# Patient Record
Sex: MALE | Race: WHITE | NOT HISPANIC OR LATINO | Employment: FULL TIME | URBAN - NONMETROPOLITAN AREA
[De-identification: names, ages, dates, MRNs, and addresses within clinical notes are randomized per-mention and may not be internally consistent; named-entity substitution may affect disease eponyms.]

---

## 2021-03-12 ENCOUNTER — HOSPITAL ENCOUNTER (EMERGENCY)
Facility: HOSPITAL | Age: 31
Discharge: HOME | End: 2021-03-12
Attending: EMERGENCY MEDICINE | Admitting: EMERGENCY MEDICINE

## 2021-03-12 VITALS
BODY MASS INDEX: 30.06 KG/M2 | WEIGHT: 210 LBS | HEART RATE: 86 BPM | RESPIRATION RATE: 18 BRPM | HEIGHT: 70 IN | OXYGEN SATURATION: 98 % | SYSTOLIC BLOOD PRESSURE: 125 MMHG | TEMPERATURE: 98.3 F | DIASTOLIC BLOOD PRESSURE: 77 MMHG

## 2021-03-12 DIAGNOSIS — J06.9 UPPER RESPIRATORY TRACT INFECTION, UNSPECIFIED TYPE: Primary | ICD-10-CM

## 2021-03-12 LAB
FLUAV RNA RESP QL NAA+PROBE: NOT DETECTED
FLUBV RNA RESP QL NAA+PROBE: NOT DETECTED
SARS-COV-2 RNA RESP QL NAA+PROBE: NOT DETECTED

## 2021-03-12 PROCEDURE — 87636 SARSCOV2 & INF A&B AMP PRB: CPT | Performed by: PHYSICIAN ASSISTANT

## 2021-03-12 PROCEDURE — 99283 EMERGENCY DEPT VISIT LOW MDM: CPT

## 2021-03-12 PROCEDURE — C9803 HOPD COVID-19 SPEC COLLECT: HCPCS

## 2021-03-12 RX ORDER — METHYLPREDNISOLONE 4 MG/1
TABLET ORAL
Qty: 21 TABLET | Refills: 0 | OUTPATIENT
Start: 2021-03-12 | End: 2022-10-19

## 2021-03-12 RX ORDER — AZITHROMYCIN 250 MG/1
TABLET, FILM COATED ORAL
Qty: 6 TABLET | Refills: 0 | OUTPATIENT
Start: 2021-03-12 | End: 2022-10-19

## 2021-03-13 NOTE — ED PROVIDER NOTES
Subjective     History provided by:  Patient  URI  Presenting symptoms: congestion, cough and rhinorrhea    Presenting symptoms: no fever    Severity:  Mild  Onset quality:  Sudden  Duration:  2 days  Timing:  Constant  Progression:  Worsening  Chronicity:  New  Relieved by:  Nothing      Review of Systems   Constitutional: Negative.  Negative for fever.   HENT: Positive for congestion and rhinorrhea.    Respiratory: Positive for cough.    Cardiovascular: Negative.  Negative for chest pain.   Gastrointestinal: Negative.  Negative for abdominal pain.   Endocrine: Negative.    Genitourinary: Negative.  Negative for dysuria.   Skin: Negative.    Neurological: Negative.    Psychiatric/Behavioral: Negative.    All other systems reviewed and are negative.      No past medical history on file.    No Known Allergies    No past surgical history on file.    No family history on file.    Social History     Socioeconomic History   • Marital status:      Spouse name: Not on file   • Number of children: Not on file   • Years of education: Not on file   • Highest education level: Not on file           Objective   Physical Exam  Vitals and nursing note reviewed.   Constitutional:       General: He is not in acute distress.     Appearance: He is well-developed. He is not diaphoretic.   HENT:      Head: Normocephalic and atraumatic.      Right Ear: External ear normal.      Left Ear: External ear normal.      Nose: Nose normal.   Eyes:      Conjunctiva/sclera: Conjunctivae normal.   Neck:      Vascular: No JVD.      Trachea: No tracheal deviation.   Cardiovascular:      Rate and Rhythm: Normal rate and regular rhythm.      Heart sounds: Normal heart sounds. No murmur.   Pulmonary:      Effort: Pulmonary effort is normal. No respiratory distress.      Breath sounds: Normal breath sounds. No wheezing.   Abdominal:      Palpations: Abdomen is soft.      Tenderness: There is no abdominal tenderness.   Musculoskeletal:          General: No deformity. Normal range of motion.      Cervical back: Normal range of motion and neck supple.   Skin:     General: Skin is warm and dry.      Coloration: Skin is not pale.      Findings: No erythema or rash.   Neurological:      Mental Status: He is alert and oriented to person, place, and time.      Cranial Nerves: No cranial nerve deficit.   Psychiatric:         Behavior: Behavior normal.         Thought Content: Thought content normal.         Procedures           ED Course                                           MDM  Number of Diagnoses or Management Options  Upper respiratory tract infection, unspecified type: new and requires workup     Amount and/or Complexity of Data Reviewed  Clinical lab tests: ordered and reviewed    Risk of Complications, Morbidity, and/or Mortality  Presenting problems: low  Diagnostic procedures: low  Management options: low    Patient Progress  Patient progress: stable      Final diagnoses:   Upper respiratory tract infection, unspecified type            Sushant Fontenot II, PA  03/12/21 5932

## 2022-10-19 ENCOUNTER — HOSPITAL ENCOUNTER (EMERGENCY)
Facility: HOSPITAL | Age: 32
Discharge: HOME | End: 2022-10-19
Attending: EMERGENCY MEDICINE | Admitting: EMERGENCY MEDICINE

## 2022-10-19 ENCOUNTER — APPOINTMENT (OUTPATIENT)
Dept: CT IMAGING | Facility: HOSPITAL | Age: 32
End: 2022-10-19

## 2022-10-19 VITALS
HEIGHT: 70 IN | WEIGHT: 180 LBS | TEMPERATURE: 97.7 F | OXYGEN SATURATION: 100 % | DIASTOLIC BLOOD PRESSURE: 72 MMHG | SYSTOLIC BLOOD PRESSURE: 124 MMHG | RESPIRATION RATE: 18 BRPM | HEART RATE: 114 BPM | BODY MASS INDEX: 25.77 KG/M2

## 2022-10-19 DIAGNOSIS — R10.9 RIGHT FLANK PAIN: Primary | ICD-10-CM

## 2022-10-19 LAB
ALBUMIN SERPL-MCNC: 4.52 G/DL (ref 3.5–5.2)
ALBUMIN/GLOB SERPL: 2.1 G/DL
ALP SERPL-CCNC: 67 U/L (ref 39–117)
ALT SERPL W P-5'-P-CCNC: 37 U/L (ref 1–41)
AMPHET+METHAMPHET UR QL: POSITIVE
AMPHETAMINES UR QL: POSITIVE
ANION GAP SERPL CALCULATED.3IONS-SCNC: 10.2 MMOL/L (ref 5–15)
AST SERPL-CCNC: 24 U/L (ref 1–40)
BARBITURATES UR QL SCN: NEGATIVE
BASOPHILS # BLD AUTO: 0.03 10*3/MM3 (ref 0–0.2)
BASOPHILS NFR BLD AUTO: 0.4 % (ref 0–1.5)
BENZODIAZ UR QL SCN: NEGATIVE
BILIRUB SERPL-MCNC: 0.5 MG/DL (ref 0–1.2)
BILIRUB UR QL STRIP: NEGATIVE
BUN SERPL-MCNC: 14 MG/DL (ref 6–20)
BUN/CREAT SERPL: 16.3 (ref 7–25)
BUPRENORPHINE SERPL-MCNC: NEGATIVE NG/ML
C TRACH RRNA CVX QL NAA+PROBE: NOT DETECTED
CALCIUM SPEC-SCNC: 9.2 MG/DL (ref 8.6–10.5)
CANNABINOIDS SERPL QL: NEGATIVE
CHLORIDE SERPL-SCNC: 104 MMOL/L (ref 98–107)
CLARITY UR: CLEAR
CO2 SERPL-SCNC: 26.8 MMOL/L (ref 22–29)
COCAINE UR QL: NEGATIVE
COLOR UR: ABNORMAL
CREAT SERPL-MCNC: 0.86 MG/DL (ref 0.76–1.27)
CRP SERPL-MCNC: <0.3 MG/DL (ref 0–0.5)
DEPRECATED RDW RBC AUTO: 45 FL (ref 37–54)
EGFRCR SERPLBLD CKD-EPI 2021: 117.3 ML/MIN/1.73
EOSINOPHIL # BLD AUTO: 0.15 10*3/MM3 (ref 0–0.4)
EOSINOPHIL NFR BLD AUTO: 2 % (ref 0.3–6.2)
ERYTHROCYTE [DISTWIDTH] IN BLOOD BY AUTOMATED COUNT: 13.4 % (ref 12.3–15.4)
GLOBULIN UR ELPH-MCNC: 2.2 GM/DL
GLUCOSE SERPL-MCNC: 127 MG/DL (ref 65–99)
GLUCOSE UR STRIP-MCNC: NEGATIVE MG/DL
HCT VFR BLD AUTO: 41.4 % (ref 37.5–51)
HGB BLD-MCNC: 14.4 G/DL (ref 13–17.7)
HGB UR QL STRIP.AUTO: NEGATIVE
IMM GRANULOCYTES # BLD AUTO: 0.02 10*3/MM3 (ref 0–0.05)
IMM GRANULOCYTES NFR BLD AUTO: 0.3 % (ref 0–0.5)
KETONES UR QL STRIP: NEGATIVE
LEUKOCYTE ESTERASE UR QL STRIP.AUTO: NEGATIVE
LYMPHOCYTES # BLD AUTO: 3.84 10*3/MM3 (ref 0.7–3.1)
LYMPHOCYTES NFR BLD AUTO: 52.4 % (ref 19.6–45.3)
MCH RBC QN AUTO: 31.6 PG (ref 26.6–33)
MCHC RBC AUTO-ENTMCNC: 34.8 G/DL (ref 31.5–35.7)
MCV RBC AUTO: 91 FL (ref 79–97)
METHADONE UR QL SCN: NEGATIVE
MONOCYTES # BLD AUTO: 0.42 10*3/MM3 (ref 0.1–0.9)
MONOCYTES NFR BLD AUTO: 5.7 % (ref 5–12)
N GONORRHOEA RRNA SPEC QL NAA+PROBE: NOT DETECTED
NEUTROPHILS NFR BLD AUTO: 2.87 10*3/MM3 (ref 1.7–7)
NEUTROPHILS NFR BLD AUTO: 39.2 % (ref 42.7–76)
NITRITE UR QL STRIP: NEGATIVE
NRBC BLD AUTO-RTO: 0 /100 WBC (ref 0–0.2)
OPIATES UR QL: NEGATIVE
OXYCODONE UR QL SCN: NEGATIVE
PCP UR QL SCN: NEGATIVE
PH UR STRIP.AUTO: 6 [PH] (ref 5–8)
PLATELET # BLD AUTO: 228 10*3/MM3 (ref 140–450)
PMV BLD AUTO: 9.1 FL (ref 6–12)
POTASSIUM SERPL-SCNC: 3.9 MMOL/L (ref 3.5–5.2)
PROPOXYPH UR QL: NEGATIVE
PROT SERPL-MCNC: 6.7 G/DL (ref 6–8.5)
PROT UR QL STRIP: NEGATIVE
RBC # BLD AUTO: 4.55 10*6/MM3 (ref 4.14–5.8)
SODIUM SERPL-SCNC: 141 MMOL/L (ref 136–145)
SP GR UR STRIP: 1.03 (ref 1–1.03)
TRICYCLICS UR QL SCN: NEGATIVE
UROBILINOGEN UR QL STRIP: ABNORMAL
WBC NRBC COR # BLD: 7.33 10*3/MM3 (ref 3.4–10.8)

## 2022-10-19 PROCEDURE — 74176 CT ABD & PELVIS W/O CONTRAST: CPT

## 2022-10-19 PROCEDURE — 80053 COMPREHEN METABOLIC PANEL: CPT | Performed by: PHYSICIAN ASSISTANT

## 2022-10-19 PROCEDURE — 85025 COMPLETE CBC W/AUTO DIFF WBC: CPT | Performed by: PHYSICIAN ASSISTANT

## 2022-10-19 PROCEDURE — 87491 CHLMYD TRACH DNA AMP PROBE: CPT | Performed by: PHYSICIAN ASSISTANT

## 2022-10-19 PROCEDURE — 99283 EMERGENCY DEPT VISIT LOW MDM: CPT

## 2022-10-19 PROCEDURE — 96375 TX/PRO/DX INJ NEW DRUG ADDON: CPT

## 2022-10-19 PROCEDURE — 80306 DRUG TEST PRSMV INSTRMNT: CPT | Performed by: PHYSICIAN ASSISTANT

## 2022-10-19 PROCEDURE — 87591 N.GONORRHOEAE DNA AMP PROB: CPT | Performed by: PHYSICIAN ASSISTANT

## 2022-10-19 PROCEDURE — 25010000002 KETOROLAC TROMETHAMINE PER 15 MG: Performed by: PHYSICIAN ASSISTANT

## 2022-10-19 PROCEDURE — 86140 C-REACTIVE PROTEIN: CPT | Performed by: PHYSICIAN ASSISTANT

## 2022-10-19 PROCEDURE — 81003 URINALYSIS AUTO W/O SCOPE: CPT | Performed by: PHYSICIAN ASSISTANT

## 2022-10-19 PROCEDURE — 96374 THER/PROPH/DIAG INJ IV PUSH: CPT

## 2022-10-19 PROCEDURE — 25010000002 ONDANSETRON PER 1 MG: Performed by: PHYSICIAN ASSISTANT

## 2022-10-19 PROCEDURE — 74176 CT ABD & PELVIS W/O CONTRAST: CPT | Performed by: RADIOLOGY

## 2022-10-19 RX ORDER — KETOROLAC TROMETHAMINE 30 MG/ML
30 INJECTION, SOLUTION INTRAMUSCULAR; INTRAVENOUS ONCE
Status: COMPLETED | OUTPATIENT
Start: 2022-10-19 | End: 2022-10-19

## 2022-10-19 RX ORDER — SODIUM CHLORIDE 0.9 % (FLUSH) 0.9 %
10 SYRINGE (ML) INJECTION AS NEEDED
Status: DISCONTINUED | OUTPATIENT
Start: 2022-10-19 | End: 2022-10-19 | Stop reason: HOSPADM

## 2022-10-19 RX ORDER — ONDANSETRON 2 MG/ML
4 INJECTION INTRAMUSCULAR; INTRAVENOUS ONCE
Status: COMPLETED | OUTPATIENT
Start: 2022-10-19 | End: 2022-10-19

## 2022-10-19 RX ADMIN — ONDANSETRON 4 MG: 2 INJECTION INTRAMUSCULAR; INTRAVENOUS at 12:55

## 2022-10-19 RX ADMIN — KETOROLAC TROMETHAMINE 30 MG: 30 INJECTION, SOLUTION INTRAMUSCULAR at 12:55

## 2022-10-19 RX ADMIN — SODIUM CHLORIDE 1000 ML: 9 INJECTION, SOLUTION INTRAVENOUS at 12:55

## 2022-10-19 NOTE — ED PROVIDER NOTES
Subjective   History of Present Illness  This is a 32 year old male patient who presents to the ER with chief complaint of low back pain. No PMH. Intermittently for 2 months, the patient has had right sided low back radiating into the right flank, dysuria, hematuria, urinary pressure/frequency and urgency. He was seen by urgent care 2 weeks ago and given flomax which helped but he says when he isn't taking the flomax, symptoms are worse. Denies nausea, vomiting, fever, diarrhea, constipation, penile discharge.         Review of Systems   Constitutional: Negative.  Negative for fever.   HENT: Negative.    Respiratory: Negative.    Cardiovascular: Negative.  Negative for chest pain.   Gastrointestinal: Positive for abdominal pain. Negative for abdominal distention, anal bleeding, blood in stool, constipation, diarrhea, nausea, rectal pain and vomiting.   Endocrine: Negative.    Genitourinary: Positive for difficulty urinating, flank pain, frequency, hematuria and urgency. Negative for decreased urine volume, dysuria, enuresis, genital sores, penile discharge, penile pain, penile swelling, scrotal swelling and testicular pain.   Musculoskeletal: Positive for back pain. Negative for arthralgias, gait problem, joint swelling, myalgias, neck pain and neck stiffness.   Skin: Negative.    Neurological: Negative.    Psychiatric/Behavioral: Negative.    All other systems reviewed and are negative.      History reviewed. No pertinent past medical history.    No Known Allergies    History reviewed. No pertinent surgical history.    History reviewed. No pertinent family history.    Social History     Socioeconomic History   • Marital status:    Tobacco Use   • Smoking status: Never   • Smokeless tobacco: Former   Substance and Sexual Activity   • Alcohol use: Not Currently   • Drug use: Not Currently   • Sexual activity: Yes           Objective   Physical Exam  Vitals and nursing note reviewed.   Constitutional:        General: He is not in acute distress.     Appearance: He is well-developed. He is not diaphoretic.   HENT:      Head: Normocephalic and atraumatic.      Right Ear: External ear normal.      Left Ear: External ear normal.      Nose: Nose normal.   Eyes:      Conjunctiva/sclera: Conjunctivae normal.      Pupils: Pupils are equal, round, and reactive to light.   Neck:      Vascular: No JVD.      Trachea: No tracheal deviation.   Cardiovascular:      Rate and Rhythm: Normal rate and regular rhythm.      Heart sounds: Normal heart sounds. No murmur heard.  Pulmonary:      Effort: Pulmonary effort is normal. No respiratory distress.      Breath sounds: Normal breath sounds. No wheezing.   Abdominal:      General: Bowel sounds are normal.      Palpations: Abdomen is soft.      Tenderness: There is no abdominal tenderness. There is no right CVA tenderness, left CVA tenderness, guarding or rebound.   Musculoskeletal:         General: No deformity. Normal range of motion.      Cervical back: Normal range of motion and neck supple.   Skin:     General: Skin is warm and dry.      Coloration: Skin is not pale.      Findings: No erythema or rash.   Neurological:      Mental Status: He is alert and oriented to person, place, and time.      Cranial Nerves: No cranial nerve deficit.   Psychiatric:         Behavior: Behavior normal.         Thought Content: Thought content normal.         Procedures           ED Course  ED Course as of 10/19/22 1426   Wed Oct 19, 2022   1325 CT Abdomen Pelvis Without Contrast  IMPRESSION:  1.  No acute findings within abdomen or pelvis.  2.  No renal or ureteral stones. No obstructive uropathy.  3.  Moderate constipation. No bowel obstruction.     This report was finalized on 10/19/2022 1:22 PM by Dr. Bo Villa MD [MM]   1423 Patient has a normal work up. Possibly already passed a kidney stone at this point. Will f/u with PCP in 2 days or return to ER if symptoms worsen.  [MM]      ED Course  User Index  [MM] Neeta Gilliland PA                                           MDM  Number of Diagnoses or Management Options     Amount and/or Complexity of Data Reviewed  Clinical lab tests: ordered and reviewed  Tests in the radiology section of CPT®: ordered and reviewed  Discuss the patient with other providers: yes        Final diagnoses:   Right flank pain       ED Disposition  ED Disposition     ED Disposition   Discharge    Condition   Stable    Comment   --             PATIENT CONNECTION - [place]  See Provider List  [place] [address]  [phone]  In 2 days           Medication List      Stop    azithromycin 250 MG tablet  Commonly known as: Zithromax     methylPREDNISolone 4 MG dose pack  Commonly known as: MEDROL             Neeta Gilliland PA  10/19/22 1425

## 2022-11-17 ENCOUNTER — HOSPITAL ENCOUNTER (EMERGENCY)
Facility: HOSPITAL | Age: 32
Discharge: HOME | End: 2022-11-17
Attending: STUDENT IN AN ORGANIZED HEALTH CARE EDUCATION/TRAINING PROGRAM | Admitting: STUDENT IN AN ORGANIZED HEALTH CARE EDUCATION/TRAINING PROGRAM

## 2022-11-17 VITALS
RESPIRATION RATE: 20 BRPM | SYSTOLIC BLOOD PRESSURE: 138 MMHG | HEIGHT: 70 IN | BODY MASS INDEX: 26.48 KG/M2 | TEMPERATURE: 100.2 F | WEIGHT: 185 LBS | HEART RATE: 92 BPM | OXYGEN SATURATION: 100 % | DIASTOLIC BLOOD PRESSURE: 81 MMHG

## 2022-11-17 DIAGNOSIS — U07.1 COVID-19: Primary | ICD-10-CM

## 2022-11-17 LAB
FLUAV SUBTYP SPEC NAA+PROBE: NOT DETECTED
FLUBV RNA ISLT QL NAA+PROBE: NOT DETECTED
SARS-COV-2 RNA PNL SPEC NAA+PROBE: DETECTED

## 2022-11-17 PROCEDURE — 87636 SARSCOV2 & INF A&B AMP PRB: CPT | Performed by: PHYSICIAN ASSISTANT

## 2022-11-17 PROCEDURE — 99283 EMERGENCY DEPT VISIT LOW MDM: CPT

## 2022-11-17 PROCEDURE — C9803 HOPD COVID-19 SPEC COLLECT: HCPCS

## 2022-11-17 RX ORDER — IBUPROFEN 400 MG/1
800 TABLET ORAL ONCE
Status: COMPLETED | OUTPATIENT
Start: 2022-11-17 | End: 2022-11-17

## 2022-11-17 RX ORDER — DEXAMETHASONE 6 MG/1
6 TABLET ORAL
Qty: 7 TABLET | Refills: 0 | Status: SHIPPED | OUTPATIENT
Start: 2022-11-17 | End: 2023-01-24

## 2022-11-17 RX ADMIN — IBUPROFEN 800 MG: 400 TABLET, FILM COATED ORAL at 07:32

## 2022-11-17 NOTE — ED PROVIDER NOTES
Subjective     History provided by:  Patient  URI  Presenting symptoms: congestion, cough, fever and rhinorrhea    Severity:  Moderate  Onset quality:  Sudden  Duration:  2 days  Timing:  Constant  Progression:  Worsening  Chronicity:  New  Relieved by:  Nothing  Risk factors: sick contacts (covid)        Review of Systems   Constitutional: Positive for fever.   HENT: Positive for congestion and rhinorrhea.    Respiratory: Positive for cough.    Cardiovascular: Negative.  Negative for chest pain.   Gastrointestinal: Negative.  Negative for abdominal pain.   Endocrine: Negative.    Genitourinary: Negative.  Negative for dysuria.   Skin: Negative.    Neurological: Negative.    Psychiatric/Behavioral: Negative.    All other systems reviewed and are negative.      No past medical history on file.    No Known Allergies    No past surgical history on file.    No family history on file.    Social History     Socioeconomic History   • Marital status:    Tobacco Use   • Smoking status: Never   • Smokeless tobacco: Former   Substance and Sexual Activity   • Alcohol use: Not Currently   • Drug use: Not Currently   • Sexual activity: Yes           Objective   Physical Exam  Vitals and nursing note reviewed.   Constitutional:       General: He is not in acute distress.     Appearance: He is well-developed. He is not diaphoretic.   HENT:      Head: Normocephalic and atraumatic.      Right Ear: External ear normal.      Left Ear: External ear normal.      Nose: Rhinorrhea present.   Eyes:      Conjunctiva/sclera: Conjunctivae normal.      Pupils: Pupils are equal, round, and reactive to light.   Neck:      Vascular: No JVD.      Trachea: No tracheal deviation.   Cardiovascular:      Rate and Rhythm: Normal rate and regular rhythm.      Heart sounds: No murmur heard.  Pulmonary:      Effort: Pulmonary effort is normal. No respiratory distress.      Breath sounds: No wheezing.   Abdominal:      Palpations: Abdomen is soft.       Tenderness: There is no abdominal tenderness.   Musculoskeletal:         General: No deformity. Normal range of motion.      Cervical back: Normal range of motion and neck supple.   Skin:     General: Skin is warm and dry.      Coloration: Skin is not pale.      Findings: No erythema or rash.   Neurological:      Mental Status: He is alert and oriented to person, place, and time.      Cranial Nerves: No cranial nerve deficit.   Psychiatric:         Behavior: Behavior normal.         Thought Content: Thought content normal.         Procedures           ED Course                                           MDM  Number of Diagnoses or Management Options  COVID-19: new and requires workup     Amount and/or Complexity of Data Reviewed  Clinical lab tests: ordered and reviewed    Risk of Complications, Morbidity, and/or Mortality  Presenting problems: low  Diagnostic procedures: low  Management options: low    Patient Progress  Patient progress: stable      Final diagnoses:   COVID-19       ED Disposition  ED Disposition     ED Disposition   Discharge    Condition   Stable    Comment   --             PATIENT CONNECTION - [facility]  See Provider List  [place] [address]  [phone]  Schedule an appointment as soon as possible for a visit            Medication List      New Prescriptions    dexamethasone 6 MG tablet  Commonly known as: DECADRON  Take 1 tablet by mouth Daily With Breakfast.           Where to Get Your Medications      These medications were sent to [facility] [id number] - [place]O MORRIS - [address] AT [facility] [address] - [phone]  - [phone] FX  [address] [place] [address]    Phone: [phone]   · dexamethasone 6 MG tablet          Ssuhant Fontenot II, PA  11/17/22 4125

## 2022-12-01 ENCOUNTER — OFFICE VISIT (OUTPATIENT)
Dept: UROLOGY | Facility: CLINIC | Age: 32
End: 2022-12-01

## 2022-12-01 VITALS — HEIGHT: 70 IN | BODY MASS INDEX: 26.48 KG/M2 | WEIGHT: 185 LBS

## 2022-12-01 DIAGNOSIS — N41.1 PROSTATITIS, CHRONIC: Primary | ICD-10-CM

## 2022-12-01 PROCEDURE — 99203 OFFICE O/P NEW LOW 30 MIN: CPT | Performed by: UROLOGY

## 2022-12-01 RX ORDER — SULFAMETHOXAZOLE AND TRIMETHOPRIM 800; 160 MG/1; MG/1
1 TABLET ORAL 2 TIMES DAILY
Qty: 84 TABLET | Refills: 2 | Status: SHIPPED | OUTPATIENT
Start: 2022-12-01 | End: 2023-01-24

## 2022-12-01 NOTE — PROGRESS NOTES
Chief Complaint:      Chief Complaint   Patient presents with   • Right flank pain     New pt       HPI:   32 y.o. male returns today has uncomfortable right-sided flank pain he does not feel like he can empty.  He has had no sex for 13 months his urine is negative.  Negligible residual.  Had a CT scan.  His CT was negative.  Start him on Bactrim for presumptive prostatitis.  He needs to increase the frequency of intercourse.    Past Medical History:     History reviewed. No pertinent past medical history.    Current Meds:     Current Outpatient Medications   Medication Sig Dispense Refill   • dexamethasone (DECADRON) 6 MG tablet Take 1 tablet by mouth Daily With Breakfast. 7 tablet 0     No current facility-administered medications for this visit.        Allergies:      No Known Allergies     Past Surgical History:     History reviewed. No pertinent surgical history.    Social History:     Social History     Socioeconomic History   • Marital status:    Tobacco Use   • Smoking status: Never   • Smokeless tobacco: Former   Substance and Sexual Activity   • Alcohol use: Not Currently   • Drug use: Not Currently   • Sexual activity: Yes       Family History:     History reviewed. No pertinent family history.    Review of Systems:     Review of Systems   Constitutional: Negative.    HENT: Negative.    Eyes: Negative.    Respiratory: Negative.    Cardiovascular: Negative.    Gastrointestinal: Negative.    Endocrine: Negative.    Musculoskeletal: Negative.    Allergic/Immunologic: Negative.    Neurological: Negative.    Hematological: Negative.    Psychiatric/Behavioral: Negative.        Physical Exam:     Physical Exam  Vitals and nursing note reviewed.   Constitutional:       Appearance: He is well-developed.   HENT:      Head: Normocephalic and atraumatic.   Eyes:      Conjunctiva/sclera: Conjunctivae normal.      Pupils: Pupils are equal, round, and reactive to light.   Cardiovascular:      Rate and Rhythm:  Normal rate and regular rhythm.      Heart sounds: Normal heart sounds.   Pulmonary:      Effort: Pulmonary effort is normal.      Breath sounds: Normal breath sounds.   Abdominal:      General: Bowel sounds are normal.      Palpations: Abdomen is soft.   Musculoskeletal:         General: Normal range of motion.      Cervical back: Normal range of motion.   Skin:     General: Skin is warm and dry.   Neurological:      Mental Status: He is alert and oriented to person, place, and time.      Deep Tendon Reflexes: Reflexes are normal and symmetric.   Psychiatric:         Behavior: Behavior normal.         Thought Content: Thought content normal.         Judgment: Judgment normal.         I have reviewed the following portions of the patient's history: Allergies, current medications, past family history, past medical history, past social history, past surgical history, problem list, and ROS and confirm it is accurate.    Recent Image (CT and/or KUB):      CT Abdomen and Pelvis: No results found for this or any previous visit.       CT Stone Protocol: No results found for this or any previous visit.       KUB: No results found for this or any previous visit.       Labs (past 3 months):      Admission on 11/17/2022, Discharged on 11/17/2022   Component Date Value Ref Range Status   • COVID19 11/17/2022 Detected (C)  Not Detected - Ref. Range Final   • Influenza A PCR 11/17/2022 Not Detected  Not Detected Final   • Influenza B PCR 11/17/2022 Not Detected  Not Detected Final   Admission on 10/19/2022, Discharged on 10/19/2022   Component Date Value Ref Range Status   • Glucose 10/19/2022 127 (H)  65 - 99 mg/dL Final   • BUN 10/19/2022 14  6 - 20 mg/dL Final   • Creatinine 10/19/2022 0.86  0.76 - 1.27 mg/dL Final   • Sodium 10/19/2022 141  136 - 145 mmol/L Final   • Potassium 10/19/2022 3.9  3.5 - 5.2 mmol/L Final   • Chloride 10/19/2022 104  98 - 107 mmol/L Final   • CO2 10/19/2022 26.8  22.0 - 29.0 mmol/L Final   • Calcium  10/19/2022 9.2  8.6 - 10.5 mg/dL Final   • Total Protein 10/19/2022 6.7  6.0 - 8.5 g/dL Final   • Albumin 10/19/2022 4.52  3.50 - 5.20 g/dL Final   • ALT (SGPT) 10/19/2022 37  1 - 41 U/L Final   • AST (SGOT) 10/19/2022 24  1 - 40 U/L Final   • Alkaline Phosphatase 10/19/2022 67  39 - 117 U/L Final   • Total Bilirubin 10/19/2022 0.5  0.0 - 1.2 mg/dL Final   • Globulin 10/19/2022 2.2  gm/dL Final   • A/G Ratio 10/19/2022 2.1  g/dL Final   • BUN/Creatinine Ratio 10/19/2022 16.3  7.0 - 25.0 Final   • Anion Gap 10/19/2022 10.2  5.0 - 15.0 mmol/L Final   • eGFR 10/19/2022 117.3  >60.0 mL/min/1.73 Final    National Kidney Foundation and American Society of Nephrology (ASN) Task Force recommended calculation based on the Chronic Kidney Disease Epidemiology Collaboration (CKD-EPI) equation refit without adjustment for race.   • C-Reactive Protein 10/19/2022 <0.30  0.00 - 0.50 mg/dL Final   • Color, UA 10/19/2022 Dark Yellow (A)  Yellow, Straw Final   • Appearance, UA 10/19/2022 Clear  Clear Final   • pH, UA 10/19/2022 6.0  5.0 - 8.0 Final   • Specific Gravity, UA 10/19/2022 1.029  1.005 - 1.030 Final   • Glucose, UA 10/19/2022 Negative  Negative Final   • Ketones, UA 10/19/2022 Negative  Negative Final   • Bilirubin, UA 10/19/2022 Negative  Negative Final   • Blood, UA 10/19/2022 Negative  Negative Final   • Protein, UA 10/19/2022 Negative  Negative Final   • Leuk Esterase, UA 10/19/2022 Negative  Negative Final   • Nitrite, UA 10/19/2022 Negative  Negative Final   • Urobilinogen, UA 10/19/2022 1.0 E.U./dL  0.2 - 1.0 E.U./dL Final   • Chlamydia DNA by PCR 10/19/2022 Not Detected  Not Detected Final   • Neisseria gonorrhoeae by PCR 10/19/2022 Not Detected  Not Detected Final   • THC, Screen, Urine 10/19/2022 Negative  Negative Final   • Phencyclidine (PCP), Urine 10/19/2022 Negative  Negative Final   • Cocaine Screen, Urine 10/19/2022 Negative  Negative Final   • Methamphetamine, Ur 10/19/2022 Positive (A)  Negative Final   •  Opiate Screen 10/19/2022 Negative  Negative Final   • Amphetamine Screen, Urine 10/19/2022 Positive (A)  Negative Final   • Benzodiazepine Screen, Urine 10/19/2022 Negative  Negative Final   • Tricyclic Antidepressants Screen 10/19/2022 Negative  Negative Final   • Methadone Screen, Urine 10/19/2022 Negative  Negative Final   • Barbiturates Screen, Urine 10/19/2022 Negative  Negative Final   • Oxycodone Screen, Urine 10/19/2022 Negative  Negative Final   • Propoxyphene Screen 10/19/2022 Negative  Negative Final   • Buprenorphine, Screen, Urine 10/19/2022 Negative  Negative Final   • WBC 10/19/2022 7.33  3.40 - 10.80 10*3/mm3 Final   • RBC 10/19/2022 4.55  4.14 - 5.80 10*6/mm3 Final   • Hemoglobin 10/19/2022 14.4  13.0 - 17.7 g/dL Final   • Hematocrit 10/19/2022 41.4  37.5 - 51.0 % Final   • MCV 10/19/2022 91.0  79.0 - 97.0 fL Final   • MCH 10/19/2022 31.6  26.6 - 33.0 pg Final   • MCHC 10/19/2022 34.8  31.5 - 35.7 g/dL Final   • RDW 10/19/2022 13.4  12.3 - 15.4 % Final   • RDW-SD 10/19/2022 45.0  37.0 - 54.0 fl Final   • MPV 10/19/2022 9.1  6.0 - 12.0 fL Final   • Platelets 10/19/2022 228  140 - 450 10*3/mm3 Final   • Neutrophil % 10/19/2022 39.2 (L)  42.7 - 76.0 % Final   • Lymphocyte % 10/19/2022 52.4 (H)  19.6 - 45.3 % Final   • Monocyte % 10/19/2022 5.7  5.0 - 12.0 % Final   • Eosinophil % 10/19/2022 2.0  0.3 - 6.2 % Final   • Basophil % 10/19/2022 0.4  0.0 - 1.5 % Final   • Immature Grans % 10/19/2022 0.3  0.0 - 0.5 % Final   • Neutrophils, Absolute 10/19/2022 2.87  1.70 - 7.00 10*3/mm3 Final   • Lymphocytes, Absolute 10/19/2022 3.84 (H)  0.70 - 3.10 10*3/mm3 Final   • Monocytes, Absolute 10/19/2022 0.42  0.10 - 0.90 10*3/mm3 Final   • Eosinophils, Absolute 10/19/2022 0.15  0.00 - 0.40 10*3/mm3 Final   • Basophils, Absolute 10/19/2022 0.03  0.00 - 0.20 10*3/mm3 Final   • Immature Grans, Absolute 10/19/2022 0.02  0.00 - 0.05 10*3/mm3 Final   • nRBC 10/19/2022 0.0  0.0 - 0.2 /100 WBC Final        Procedure:        Assessment/Plan:   Prostatitis-we discussed the differential diagnosis of prostatitis including the National Institutes of Health classification system I through IV.  I discussed that type I prostatitis is acute bacterial prostatitis and associated with high fevers, typically hematuria, and severe pain with voiding.  We discussed the fact that it necessitates 6 weeks of chronic antibiotics to be sure it doesn't turn into a chronic bacterial prostatitis that can have lifelong ramifications.  We discussed National Institutes of Health type II chronic bacterial prostatitis.  We discussed the fact that this a chronic bacterial infection of the prostate that often requires suppressive antibiotics.  We discussed type III being related more to nonspecific urethritis, as well as tight for being related to finding inflammation and a biopsy in the absence of symptomatology.  I discussed the diagnostic strategies including the VB 1 through 4 urine culture and discussed empiric therapy.  I emphasized that with the use of chronic antibiotics, I strongly recommended the concomitant use of probiotics.  Additionally, we discussed the various antibiotics used and the risks and benefits associated with each, particularly the use of long-term ciprofloxacin and the effect on joints and collagen in human beings.  Start Septra and increasing frequency of intercourse                This document has been electronically signed by HANNA SEALS MD December 1, 2022 09:14 EST    Dictated Utilizing Dragon Dictation: Part of this note may be an electronic transcription/translation of spoken language to printed text using the Dragon Dictation System.

## 2022-12-09 PROBLEM — N41.1 PROSTATITIS, CHRONIC: Status: ACTIVE | Noted: 2022-12-09

## 2022-12-16 ENCOUNTER — APPOINTMENT (OUTPATIENT)
Dept: ULTRASOUND IMAGING | Facility: HOSPITAL | Age: 32
End: 2022-12-16

## 2022-12-16 ENCOUNTER — HOSPITAL ENCOUNTER (EMERGENCY)
Facility: HOSPITAL | Age: 32
Discharge: HOME | End: 2022-12-16
Attending: EMERGENCY MEDICINE | Admitting: EMERGENCY MEDICINE

## 2022-12-16 VITALS
BODY MASS INDEX: 26.48 KG/M2 | WEIGHT: 185 LBS | HEIGHT: 70 IN | TEMPERATURE: 97.7 F | DIASTOLIC BLOOD PRESSURE: 82 MMHG | RESPIRATION RATE: 16 BRPM | HEART RATE: 94 BPM | OXYGEN SATURATION: 99 % | SYSTOLIC BLOOD PRESSURE: 122 MMHG

## 2022-12-16 DIAGNOSIS — R10.31 RIGHT INGUINAL PAIN: Primary | ICD-10-CM

## 2022-12-16 LAB
ALBUMIN SERPL-MCNC: 4.15 G/DL (ref 3.5–5.2)
ALBUMIN/GLOB SERPL: 2 G/DL
ALP SERPL-CCNC: 73 U/L (ref 39–117)
ALT SERPL W P-5'-P-CCNC: 30 U/L (ref 1–41)
ANION GAP SERPL CALCULATED.3IONS-SCNC: 8.9 MMOL/L (ref 5–15)
AST SERPL-CCNC: 21 U/L (ref 1–40)
BACTERIA UR QL AUTO: ABNORMAL /HPF
BILIRUB SERPL-MCNC: 0.2 MG/DL (ref 0–1.2)
BILIRUB UR QL STRIP: NEGATIVE
BUN SERPL-MCNC: 15 MG/DL (ref 6–20)
BUN/CREAT SERPL: 16.9 (ref 7–25)
CALCIUM SPEC-SCNC: 9.3 MG/DL (ref 8.6–10.5)
CHLORIDE SERPL-SCNC: 104 MMOL/L (ref 98–107)
CLARITY UR: ABNORMAL
CO2 SERPL-SCNC: 26.1 MMOL/L (ref 22–29)
COLOR UR: YELLOW
CREAT SERPL-MCNC: 0.89 MG/DL (ref 0.76–1.27)
CRP SERPL-MCNC: <0.3 MG/DL (ref 0–0.5)
DEPRECATED RDW RBC AUTO: 47.8 FL (ref 37–54)
EGFRCR SERPLBLD CKD-EPI 2021: 116 ML/MIN/1.73
EOSINOPHIL # BLD MANUAL: 0.42 10*3/MM3 (ref 0–0.4)
EOSINOPHIL NFR BLD MANUAL: 5 % (ref 0.3–6.2)
ERYTHROCYTE [DISTWIDTH] IN BLOOD BY AUTOMATED COUNT: 13.3 % (ref 12.3–15.4)
GLOBULIN UR ELPH-MCNC: 2.1 GM/DL
GLUCOSE SERPL-MCNC: 135 MG/DL (ref 65–99)
GLUCOSE UR STRIP-MCNC: NEGATIVE MG/DL
HCT VFR BLD AUTO: 40.8 % (ref 37.5–51)
HGB BLD-MCNC: 13.7 G/DL (ref 13–17.7)
HGB UR QL STRIP.AUTO: NEGATIVE
HYALINE CASTS UR QL AUTO: ABNORMAL /LPF
KETONES UR QL STRIP: NEGATIVE
LARGE PLATELETS: ABNORMAL
LEUKOCYTE ESTERASE UR QL STRIP.AUTO: ABNORMAL
LYMPHOCYTES # BLD MANUAL: 5.43 10*3/MM3 (ref 0.7–3.1)
LYMPHOCYTES NFR BLD MANUAL: 5 % (ref 5–12)
MACROCYTES BLD QL SMEAR: ABNORMAL
MCH RBC QN AUTO: 32.4 PG (ref 26.6–33)
MCHC RBC AUTO-ENTMCNC: 33.6 G/DL (ref 31.5–35.7)
MCV RBC AUTO: 96.5 FL (ref 79–97)
MONOCYTES # BLD: 0.42 10*3/MM3 (ref 0.1–0.9)
NEUTROPHILS # BLD AUTO: 2.21 10*3/MM3 (ref 1.7–7)
NEUTROPHILS NFR BLD MANUAL: 26 % (ref 42.7–76)
NITRITE UR QL STRIP: NEGATIVE
PH UR STRIP.AUTO: 8 [PH] (ref 5–8)
PLATELET # BLD AUTO: 233 10*3/MM3 (ref 140–450)
PMV BLD AUTO: 9 FL (ref 6–12)
POTASSIUM SERPL-SCNC: 3.7 MMOL/L (ref 3.5–5.2)
PROT SERPL-MCNC: 6.2 G/DL (ref 6–8.5)
PROT UR QL STRIP: NEGATIVE
RBC # BLD AUTO: 4.23 10*6/MM3 (ref 4.14–5.8)
RBC # UR STRIP: ABNORMAL /HPF
REF LAB TEST METHOD: ABNORMAL
SODIUM SERPL-SCNC: 139 MMOL/L (ref 136–145)
SP GR UR STRIP: 1.02 (ref 1–1.03)
SQUAMOUS #/AREA URNS HPF: ABNORMAL /HPF
UROBILINOGEN UR QL STRIP: ABNORMAL
VARIANT LYMPHS NFR BLD MANUAL: 64 % (ref 19.6–45.3)
WBC # UR STRIP: ABNORMAL /HPF
WBC NRBC COR # BLD: 8.49 10*3/MM3 (ref 3.4–10.8)

## 2022-12-16 PROCEDURE — 81001 URINALYSIS AUTO W/SCOPE: CPT | Performed by: NURSE PRACTITIONER

## 2022-12-16 PROCEDURE — 76999 ECHO EXAMINATION PROCEDURE: CPT

## 2022-12-16 PROCEDURE — 85025 COMPLETE CBC W/AUTO DIFF WBC: CPT | Performed by: NURSE PRACTITIONER

## 2022-12-16 PROCEDURE — 86140 C-REACTIVE PROTEIN: CPT | Performed by: NURSE PRACTITIONER

## 2022-12-16 PROCEDURE — 99283 EMERGENCY DEPT VISIT LOW MDM: CPT

## 2022-12-16 PROCEDURE — 36415 COLL VENOUS BLD VENIPUNCTURE: CPT

## 2022-12-16 PROCEDURE — 85007 BL SMEAR W/DIFF WBC COUNT: CPT | Performed by: NURSE PRACTITIONER

## 2022-12-16 PROCEDURE — 80053 COMPREHEN METABOLIC PANEL: CPT | Performed by: NURSE PRACTITIONER

## 2022-12-16 NOTE — ED PROVIDER NOTES
Subjective     History provided by:  Patient  Groin Pain  Location:  R groin  Severity:  Mild  Onset quality:  Sudden  Timing:  Constant  Progression:  Waxing and waning  Chronicity:  New  Context:  Pt reports recently being treated with abx for prostatitis by Dr. Haider.  Associated symptoms: no abdominal pain, no chest pain, no congestion, no cough, no diarrhea, no ear pain, no fatigue, no fever, no headaches, no loss of consciousness, no myalgias, no nausea, no rash, no rhinorrhea, no shortness of breath, no sore throat, no vomiting and no wheezing        Review of Systems   Constitutional: Negative.  Negative for fatigue and fever.   HENT: Negative.  Negative for congestion, ear pain, rhinorrhea and sore throat.    Eyes: Negative.    Respiratory: Negative.  Negative for cough, shortness of breath and wheezing.    Cardiovascular: Negative.  Negative for chest pain.   Gastrointestinal: Negative.  Negative for abdominal pain, diarrhea, nausea and vomiting.   Endocrine: Negative.    Genitourinary: Negative.  Negative for dysuria, penile discharge, penile pain and penile swelling.   Musculoskeletal: Negative for myalgias.   Skin: Negative.  Negative for rash.   Allergic/Immunologic: Negative.    Neurological: Negative.  Negative for loss of consciousness and headaches.   Hematological: Negative.    Psychiatric/Behavioral: Negative.    All other systems reviewed and are negative.      No past medical history on file.    No Known Allergies    No past surgical history on file.    No family history on file.    Social History     Socioeconomic History   • Marital status:    Tobacco Use   • Smoking status: Never   • Smokeless tobacco: Former   Substance and Sexual Activity   • Alcohol use: Not Currently   • Drug use: Not Currently   • Sexual activity: Yes           Objective   Physical Exam  Vitals and nursing note reviewed.   Constitutional:       General: He is not in acute distress.     Appearance: He is  well-developed. He is not diaphoretic.   HENT:      Head: Normocephalic and atraumatic.      Right Ear: External ear normal.      Left Ear: External ear normal.      Nose: Nose normal.      Mouth/Throat:      Mouth: Mucous membranes are moist.      Pharynx: Oropharynx is clear.   Eyes:      Conjunctiva/sclera: Conjunctivae normal.      Pupils: Pupils are equal, round, and reactive to light.   Neck:      Vascular: No JVD.      Trachea: No tracheal deviation.   Cardiovascular:      Rate and Rhythm: Normal rate and regular rhythm.      Heart sounds: Normal heart sounds. No murmur heard.  Pulmonary:      Effort: Pulmonary effort is normal. No respiratory distress.      Breath sounds: Normal breath sounds. No wheezing.   Abdominal:      General: Bowel sounds are normal.      Palpations: Abdomen is soft.      Tenderness: There is no abdominal tenderness.   Musculoskeletal:         General: No deformity. Normal range of motion.      Cervical back: Normal range of motion and neck supple.   Skin:     General: Skin is warm and dry.      Coloration: Skin is not pale.      Findings: No erythema or rash.   Neurological:      Mental Status: He is alert and oriented to person, place, and time.      Cranial Nerves: No cranial nerve deficit.   Psychiatric:         Behavior: Behavior normal.         Thought Content: Thought content normal.         Procedures       Results for orders placed or performed during the hospital encounter of 12/16/22   Comprehensive Metabolic Panel    Specimen: Arm, Left; Blood   Result Value Ref Range    Glucose 135 (H) 65 - 99 mg/dL    BUN 15 6 - 20 mg/dL    Creatinine 0.89 0.76 - 1.27 mg/dL    Sodium 139 136 - 145 mmol/L    Potassium 3.7 3.5 - 5.2 mmol/L    Chloride 104 98 - 107 mmol/L    CO2 26.1 22.0 - 29.0 mmol/L    Calcium 9.3 8.6 - 10.5 mg/dL    Total Protein 6.2 6.0 - 8.5 g/dL    Albumin 4.15 3.50 - 5.20 g/dL    ALT (SGPT) 30 1 - 41 U/L    AST (SGOT) 21 1 - 40 U/L    Alkaline Phosphatase 73 39 -  117 U/L    Total Bilirubin 0.2 0.0 - 1.2 mg/dL    Globulin 2.1 gm/dL    A/G Ratio 2.0 g/dL    BUN/Creatinine Ratio 16.9 7.0 - 25.0    Anion Gap 8.9 5.0 - 15.0 mmol/L    eGFR 116.0 >60.0 mL/min/1.73   Urinalysis With Culture If Indicated - Urine, Clean Catch    Specimen: Urine, Clean Catch   Result Value Ref Range    Color, UA Yellow Yellow, Straw    Appearance, UA Turbid (A) Clear    pH, UA 8.0 5.0 - 8.0    Specific Gravity, UA 1.019 1.005 - 1.030    Glucose, UA Negative Negative    Ketones, UA Negative Negative    Bilirubin, UA Negative Negative    Blood, UA Negative Negative    Protein, UA Negative Negative    Leuk Esterase, UA Trace (A) Negative    Nitrite, UA Negative Negative    Urobilinogen, UA 1.0 E.U./dL 0.2 - 1.0 E.U./dL   C-reactive Protein    Specimen: Arm, Left; Blood   Result Value Ref Range    C-Reactive Protein <0.30 0.00 - 0.50 mg/dL   CBC Auto Differential    Specimen: Arm, Left; Blood   Result Value Ref Range    WBC 8.49 3.40 - 10.80 10*3/mm3    RBC 4.23 4.14 - 5.80 10*6/mm3    Hemoglobin 13.7 13.0 - 17.7 g/dL    Hematocrit 40.8 37.5 - 51.0 %    MCV 96.5 79.0 - 97.0 fL    MCH 32.4 26.6 - 33.0 pg    MCHC 33.6 31.5 - 35.7 g/dL    RDW 13.3 12.3 - 15.4 %    RDW-SD 47.8 37.0 - 54.0 fl    MPV 9.0 6.0 - 12.0 fL    Platelets 233 140 - 450 10*3/mm3   Manual Differential    Specimen: Arm, Left; Blood   Result Value Ref Range    Neutrophil % 26.0 (L) 42.7 - 76.0 %    Lymphocyte % 64.0 (H) 19.6 - 45.3 %    Monocyte % 5.0 5.0 - 12.0 %    Eosinophil % 5.0 0.3 - 6.2 %    Neutrophils Absolute 2.21 1.70 - 7.00 10*3/mm3    Lymphocytes Absolute 5.43 (H) 0.70 - 3.10 10*3/mm3    Monocytes Absolute 0.42 0.10 - 0.90 10*3/mm3    Eosinophils Absolute 0.42 (H) 0.00 - 0.40 10*3/mm3    Macrocytes Slight/1+ None Seen    Large Platelets Slight/1+ None Seen   Urinalysis, Microscopic Only - Urine, Clean Catch    Specimen: Urine, Clean Catch   Result Value Ref Range    RBC, UA 3-5 (A) None Seen, 0-2 /HPF    WBC, UA 3-5 (A) None  Seen, 0-2 /HPF    Bacteria, UA None Seen None Seen /HPF    Squamous Epithelial Cells, UA 0-2 None Seen, 0-2 /HPF    Hyaline Casts, UA None Seen None Seen /LPF    Methodology Automated Microscopy        ED Course  ED Course as of 12/16/22 0726   Fri Dec 16, 2022   0413 US Soft Tissue  FINDINGS:  Multiple benign-appearing mildly prominent lymph nodes are noted in the subcutaneous tissues of the right groin. No other masses are seen. There is no fluid collection. There is nothing to suggest inguinal hernia.     IMPRESSION:  A few small benign appearing subcutaneous lymph nodes are noted in the right groin. Otherwise negative. [MB]      ED Course User Index  [MB] Sharon Rodriguez APRN                                           [facility]    Final diagnoses:   Right inguinal pain       ED Disposition  ED Disposition     ED Disposition   Discharge    Condition   Stable    Comment   --             Godfrey Haider MD  [address]  [facility] [address]  [phone]    Call in 2 days           Medication List      No changes were made to your prescriptions during this visit.          Sharon Rodriguez APRN  12/16/22 0726

## 2022-12-19 ENCOUNTER — OFFICE VISIT (OUTPATIENT)
Dept: UROLOGY | Facility: CLINIC | Age: 32
End: 2022-12-19
Payer: COMMERCIAL

## 2022-12-19 VITALS — WEIGHT: 185 LBS | HEIGHT: 70 IN | BODY MASS INDEX: 26.48 KG/M2

## 2022-12-19 DIAGNOSIS — N41.1 PROSTATITIS, CHRONIC: Primary | ICD-10-CM

## 2022-12-19 PROCEDURE — 99213 OFFICE O/P EST LOW 20 MIN: CPT | Performed by: UROLOGY

## 2022-12-19 NOTE — PROGRESS NOTES
Chief Complaint:      Chief Complaint   Patient presents with   • Groin Pain       HPI:   32 y.o. male. has uncomfortable right-sided flank pain he does not feel like he can empty.  He has had no sex for 13 months his urine is negative.  Negligible residual.  Had a CT scan.  His CT was negative.  Start him on Bactrim for presumptive prostatitis.  He needs to increase the frequency of intercourse.  He is here he still has pain I am wondering do I feel a questionable soft tissue pain at the right inguinal ring.  This may be representative of a right forme fruste hernia and I told him to think about it and let me know if he wants a referral I will be glad to    Past Medical History:     No past medical history on file.    Current Meds:     Current Outpatient Medications   Medication Sig Dispense Refill   • dexamethasone (DECADRON) 6 MG tablet Take 1 tablet by mouth Daily With Breakfast. 7 tablet 0   • sulfamethoxazole-trimethoprim (Bactrim DS) 800-160 MG per tablet Take 1 tablet by mouth 2 (Two) Times a Day. 84 tablet 2     No current facility-administered medications for this visit.        Allergies:      No Known Allergies     Past Surgical History:     No past surgical history on file.    Social History:     Social History     Socioeconomic History   • Marital status:    Tobacco Use   • Smoking status: Never   • Smokeless tobacco: Former   Substance and Sexual Activity   • Alcohol use: Not Currently   • Drug use: Not Currently   • Sexual activity: Yes       Family History:     No family history on file.    Review of Systems:     Review of Systems   Constitutional: Negative.    HENT: Negative.    Eyes: Negative.    Respiratory: Negative.    Cardiovascular: Negative.    Gastrointestinal: Negative.    Endocrine: Negative.    Musculoskeletal: Negative.    Allergic/Immunologic: Negative.    Neurological: Negative.    Hematological: Negative.    Psychiatric/Behavioral: Negative.        Physical Exam:     Physical  Exam  Vitals and nursing note reviewed.   Constitutional:       Appearance: He is well-developed.   HENT:      Head: Normocephalic and atraumatic.   Eyes:      Conjunctiva/sclera: Conjunctivae normal.      Pupils: Pupils are equal, round, and reactive to light.   Cardiovascular:      Rate and Rhythm: Normal rate and regular rhythm.      Heart sounds: Normal heart sounds.   Pulmonary:      Effort: Pulmonary effort is normal.      Breath sounds: Normal breath sounds.   Abdominal:      General: Bowel sounds are normal.      Palpations: Abdomen is soft.   Genitourinary:     Comments: Right internal inguinal ring tenderness question forme fruste inguinal hernia  Musculoskeletal:         General: Normal range of motion.      Cervical back: Normal range of motion.   Skin:     General: Skin is warm and dry.   Neurological:      Mental Status: He is alert and oriented to person, place, and time.      Deep Tendon Reflexes: Reflexes are normal and symmetric.   Psychiatric:         Behavior: Behavior normal.         Thought Content: Thought content normal.         Judgment: Judgment normal.         I have reviewed the following portions of the patient's history: Allergies, current medications, past family history, past medical history, past social history, past surgical history, problem list, and ROS and confirm it is accurate.    Recent Image (CT and/or KUB):      CT Abdomen and Pelvis: No results found for this or any previous visit.       CT Stone Protocol: No results found for this or any previous visit.       KUB: No results found for this or any previous visit.       Labs (past 3 months):      Admission on 12/16/2022, Discharged on 12/16/2022   Component Date Value Ref Range Status   • Glucose 12/16/2022 135 (H)  65 - 99 mg/dL Final   • BUN 12/16/2022 15  6 - 20 mg/dL Final   • Creatinine 12/16/2022 0.89  0.76 - 1.27 mg/dL Final   • Sodium 12/16/2022 139  136 - 145 mmol/L Final   • Potassium 12/16/2022 3.7  3.5 - 5.2  mmol/L Final    Slight hemolysis detected by analyzer. Results may be affected.   • Chloride 12/16/2022 104  98 - 107 mmol/L Final   • CO2 12/16/2022 26.1  22.0 - 29.0 mmol/L Final   • Calcium 12/16/2022 9.3  8.6 - 10.5 mg/dL Final   • Total Protein 12/16/2022 6.2  6.0 - 8.5 g/dL Final   • Albumin 12/16/2022 4.15  3.50 - 5.20 g/dL Final   • ALT (SGPT) 12/16/2022 30  1 - 41 U/L Final   • AST (SGOT) 12/16/2022 21  1 - 40 U/L Final   • Alkaline Phosphatase 12/16/2022 73  39 - 117 U/L Final   • Total Bilirubin 12/16/2022 0.2  0.0 - 1.2 mg/dL Final   • Globulin 12/16/2022 2.1  gm/dL Final   • A/G Ratio 12/16/2022 2.0  g/dL Final   • BUN/Creatinine Ratio 12/16/2022 16.9  7.0 - 25.0 Final   • Anion Gap 12/16/2022 8.9  5.0 - 15.0 mmol/L Final   • eGFR 12/16/2022 116.0  >60.0 mL/min/1.73 Final    National Kidney Foundation and American Society of Nephrology (ASN) Task Force recommended calculation based on the Chronic Kidney Disease Epidemiology Collaboration (CKD-EPI) equation refit without adjustment for race.   • Color, UA 12/16/2022 Yellow  Yellow, Straw Final   • Appearance, UA 12/16/2022 Turbid (A)  Clear Final   • pH, UA 12/16/2022 8.0  5.0 - 8.0 Final   • Specific Gravity, UA 12/16/2022 1.019  1.005 - 1.030 Final   • Glucose, UA 12/16/2022 Negative  Negative Final   • Ketones, UA 12/16/2022 Negative  Negative Final   • Bilirubin, UA 12/16/2022 Negative  Negative Final   • Blood, UA 12/16/2022 Negative  Negative Final   • Protein, UA 12/16/2022 Negative  Negative Final   • Leuk Esterase, UA 12/16/2022 Trace (A)  Negative Final   • Nitrite, UA 12/16/2022 Negative  Negative Final   • Urobilinogen, UA 12/16/2022 1.0 E.U./dL  0.2 - 1.0 E.U./dL Final   • C-Reactive Protein 12/16/2022 <0.30  0.00 - 0.50 mg/dL Final   • WBC 12/16/2022 8.49  3.40 - 10.80 10*3/mm3 Final   • RBC 12/16/2022 4.23  4.14 - 5.80 10*6/mm3 Final   • Hemoglobin 12/16/2022 13.7  13.0 - 17.7 g/dL Final   • Hematocrit 12/16/2022 40.8  37.5 - 51.0 % Final    • MCV 12/16/2022 96.5  79.0 - 97.0 fL Final   • MCH 12/16/2022 32.4  26.6 - 33.0 pg Final   • MCHC 12/16/2022 33.6  31.5 - 35.7 g/dL Final   • RDW 12/16/2022 13.3  12.3 - 15.4 % Final   • RDW-SD 12/16/2022 47.8  37.0 - 54.0 fl Final   • MPV 12/16/2022 9.0  6.0 - 12.0 fL Final   • Platelets 12/16/2022 233  140 - 450 10*3/mm3 Final   • Neutrophil % 12/16/2022 26.0 (L)  42.7 - 76.0 % Final   • Lymphocyte % 12/16/2022 64.0 (H)  19.6 - 45.3 % Final    Few Atypical Lymphs Noted    • Monocyte % 12/16/2022 5.0  5.0 - 12.0 % Final   • Eosinophil % 12/16/2022 5.0  0.3 - 6.2 % Final   • Neutrophils Absolute 12/16/2022 2.21  1.70 - 7.00 10*3/mm3 Final   • Lymphocytes Absolute 12/16/2022 5.43 (H)  0.70 - 3.10 10*3/mm3 Final   • Monocytes Absolute 12/16/2022 0.42  0.10 - 0.90 10*3/mm3 Final   • Eosinophils Absolute 12/16/2022 0.42 (H)  0.00 - 0.40 10*3/mm3 Final   • Macrocytes 12/16/2022 Slight/1+  None Seen Final   • Large Platelets 12/16/2022 Slight/1+  None Seen Final   • RBC, UA 12/16/2022 3-5 (A)  None Seen, 0-2 /HPF Final   • WBC, UA 12/16/2022 3-5 (A)  None Seen, 0-2 /HPF Final    Urine culture not indicated.   • Bacteria, UA 12/16/2022 None Seen  None Seen /HPF Final   • Squamous Epithelial Cells, UA 12/16/2022 0-2  None Seen, 0-2 /HPF Final   • Hyaline Casts, UA 12/16/2022 None Seen  None Seen /LPF Final   • Methodology 12/16/2022 Automated Microscopy   Final   Admission on 11/17/2022, Discharged on 11/17/2022   Component Date Value Ref Range Status   • COVID19 11/17/2022 Detected (C)  Not Detected - Ref. Range Final   • Influenza A PCR 11/17/2022 Not Detected  Not Detected Final   • Influenza B PCR 11/17/2022 Not Detected  Not Detected Final   Admission on 10/19/2022, Discharged on 10/19/2022   Component Date Value Ref Range Status   • Glucose 10/19/2022 127 (H)  65 - 99 mg/dL Final   • BUN 10/19/2022 14  6 - 20 mg/dL Final   • Creatinine 10/19/2022 0.86  0.76 - 1.27 mg/dL Final   • Sodium 10/19/2022 141  136 - 145  mmol/L Final   • Potassium 10/19/2022 3.9  3.5 - 5.2 mmol/L Final   • Chloride 10/19/2022 104  98 - 107 mmol/L Final   • CO2 10/19/2022 26.8  22.0 - 29.0 mmol/L Final   • Calcium 10/19/2022 9.2  8.6 - 10.5 mg/dL Final   • Total Protein 10/19/2022 6.7  6.0 - 8.5 g/dL Final   • Albumin 10/19/2022 4.52  3.50 - 5.20 g/dL Final   • ALT (SGPT) 10/19/2022 37  1 - 41 U/L Final   • AST (SGOT) 10/19/2022 24  1 - 40 U/L Final   • Alkaline Phosphatase 10/19/2022 67  39 - 117 U/L Final   • Total Bilirubin 10/19/2022 0.5  0.0 - 1.2 mg/dL Final   • Globulin 10/19/2022 2.2  gm/dL Final   • A/G Ratio 10/19/2022 2.1  g/dL Final   • BUN/Creatinine Ratio 10/19/2022 16.3  7.0 - 25.0 Final   • Anion Gap 10/19/2022 10.2  5.0 - 15.0 mmol/L Final   • eGFR 10/19/2022 117.3  >60.0 mL/min/1.73 Final    National Kidney Foundation and American Society of Nephrology (ASN) Task Force recommended calculation based on the Chronic Kidney Disease Epidemiology Collaboration (CKD-EPI) equation refit without adjustment for race.   • C-Reactive Protein 10/19/2022 <0.30  0.00 - 0.50 mg/dL Final   • Color, UA 10/19/2022 Dark Yellow (A)  Yellow, Straw Final   • Appearance, UA 10/19/2022 Clear  Clear Final   • pH, UA 10/19/2022 6.0  5.0 - 8.0 Final   • Specific Gravity, UA 10/19/2022 1.029  1.005 - 1.030 Final   • Glucose, UA 10/19/2022 Negative  Negative Final   • Ketones, UA 10/19/2022 Negative  Negative Final   • Bilirubin, UA 10/19/2022 Negative  Negative Final   • Blood, UA 10/19/2022 Negative  Negative Final   • Protein, UA 10/19/2022 Negative  Negative Final   • Leuk Esterase, UA 10/19/2022 Negative  Negative Final   • Nitrite, UA 10/19/2022 Negative  Negative Final   • Urobilinogen, UA 10/19/2022 1.0 E.U./dL  0.2 - 1.0 E.U./dL Final   • Chlamydia DNA by PCR 10/19/2022 Not Detected  Not Detected Final   • Neisseria gonorrhoeae by PCR 10/19/2022 Not Detected  Not Detected Final   • THC, Screen, Urine 10/19/2022 Negative  Negative Final   • Phencyclidine  (PCP), Urine 10/19/2022 Negative  Negative Final   • Cocaine Screen, Urine 10/19/2022 Negative  Negative Final   • Methamphetamine, Ur 10/19/2022 Positive (A)  Negative Final   • Opiate Screen 10/19/2022 Negative  Negative Final   • Amphetamine Screen, Urine 10/19/2022 Positive (A)  Negative Final   • Benzodiazepine Screen, Urine 10/19/2022 Negative  Negative Final   • Tricyclic Antidepressants Screen 10/19/2022 Negative  Negative Final   • Methadone Screen, Urine 10/19/2022 Negative  Negative Final   • Barbiturates Screen, Urine 10/19/2022 Negative  Negative Final   • Oxycodone Screen, Urine 10/19/2022 Negative  Negative Final   • Propoxyphene Screen 10/19/2022 Negative  Negative Final   • Buprenorphine, Screen, Urine 10/19/2022 Negative  Negative Final   • WBC 10/19/2022 7.33  3.40 - 10.80 10*3/mm3 Final   • RBC 10/19/2022 4.55  4.14 - 5.80 10*6/mm3 Final   • Hemoglobin 10/19/2022 14.4  13.0 - 17.7 g/dL Final   • Hematocrit 10/19/2022 41.4  37.5 - 51.0 % Final   • MCV 10/19/2022 91.0  79.0 - 97.0 fL Final   • MCH 10/19/2022 31.6  26.6 - 33.0 pg Final   • MCHC 10/19/2022 34.8  31.5 - 35.7 g/dL Final   • RDW 10/19/2022 13.4  12.3 - 15.4 % Final   • RDW-SD 10/19/2022 45.0  37.0 - 54.0 fl Final   • MPV 10/19/2022 9.1  6.0 - 12.0 fL Final   • Platelets 10/19/2022 228  140 - 450 10*3/mm3 Final   • Neutrophil % 10/19/2022 39.2 (L)  42.7 - 76.0 % Final   • Lymphocyte % 10/19/2022 52.4 (H)  19.6 - 45.3 % Final   • Monocyte % 10/19/2022 5.7  5.0 - 12.0 % Final   • Eosinophil % 10/19/2022 2.0  0.3 - 6.2 % Final   • Basophil % 10/19/2022 0.4  0.0 - 1.5 % Final   • Immature Grans % 10/19/2022 0.3  0.0 - 0.5 % Final   • Neutrophils, Absolute 10/19/2022 2.87  1.70 - 7.00 10*3/mm3 Final   • Lymphocytes, Absolute 10/19/2022 3.84 (H)  0.70 - 3.10 10*3/mm3 Final   • Monocytes, Absolute 10/19/2022 0.42  0.10 - 0.90 10*3/mm3 Final   • Eosinophils, Absolute 10/19/2022 0.15  0.00 - 0.40 10*3/mm3 Final   • Basophils, Absolute 10/19/2022  0.03  0.00 - 0.20 10*3/mm3 Final   • Immature Grans, Absolute 10/19/2022 0.02  0.00 - 0.05 10*3/mm3 Final   • nRBC 10/19/2022 0.0  0.0 - 0.2 /100 WBC Final        Procedure:       Assessment/Plan:   Right possible forme fruste inguinal hernia-he wants to think about a referral  Prostatitis-we discussed the differential diagnosis of prostatitis including the National Institutes of Health classification system I through IV.  I discussed that type I prostatitis is acute bacterial prostatitis and associated with high fevers, typically hematuria, and severe pain with voiding.  We discussed the fact that it necessitates 6 weeks of chronic antibiotics to be sure it doesn't turn into a chronic bacterial prostatitis that can have lifelong ramifications.  We discussed National Institutes of Health type II chronic bacterial prostatitis.  We discussed the fact that this a chronic bacterial infection of the prostate that often requires suppressive antibiotics.  We discussed type III being related more to nonspecific urethritis, as well as tight for being related to finding inflammation and a biopsy in the absence of symptomatology.  I discussed the diagnostic strategies including the VB 1 through 4 urine culture and discussed empiric therapy.  I emphasized that with the use of chronic antibiotics, I strongly recommended the concomitant use of probiotics.  Additionally, we discussed the various antibiotics used and the risks and benefits associated with each, particularly the use of long-term ciprofloxacin and the effect on joints and collagen in human beings.  This pain is not consistent with prostatitis              This document has been electronically signed by HANNA SEALS MD December 19, 2022 14:45 EST    Dictated Utilizing Dragon Dictation: Part of this note may be an electronic transcription/translation of spoken language to printed text using the Dragon Dictation System.

## 2022-12-28 ENCOUNTER — TELEPHONE (OUTPATIENT)
Dept: UROLOGY | Facility: CLINIC | Age: 32
End: 2022-12-28

## 2023-01-06 DIAGNOSIS — N41.1 PROSTATITIS, CHRONIC: Primary | ICD-10-CM

## 2023-01-12 ENCOUNTER — OFFICE VISIT (OUTPATIENT)
Dept: UROLOGY | Facility: CLINIC | Age: 33
End: 2023-01-12
Payer: COMMERCIAL

## 2023-01-12 VITALS — WEIGHT: 185 LBS | HEIGHT: 70 IN | BODY MASS INDEX: 26.48 KG/M2

## 2023-01-12 DIAGNOSIS — N41.1 PROSTATITIS, CHRONIC: Primary | ICD-10-CM

## 2023-01-12 PROCEDURE — 99213 OFFICE O/P EST LOW 20 MIN: CPT | Performed by: UROLOGY

## 2023-01-12 NOTE — PROGRESS NOTES
Chief Complaint:      Chief Complaint   Patient presents with   • prostatitis        HPI:   32 y.o. male returns today as prostatitis is better but his groin hurts I think he has a forme fruste inguinal hernia I will refer him to Dr. Hairston for investigation.    Past Medical History:     No past medical history on file.    Current Meds:     Current Outpatient Medications   Medication Sig Dispense Refill   • dexamethasone (DECADRON) 6 MG tablet Take 1 tablet by mouth Daily With Breakfast. 7 tablet 0   • sulfamethoxazole-trimethoprim (Bactrim DS) 800-160 MG per tablet Take 1 tablet by mouth 2 (Two) Times a Day. 84 tablet 2     No current facility-administered medications for this visit.        Allergies:      No Known Allergies     Past Surgical History:     No past surgical history on file.    Social History:     Social History     Socioeconomic History   • Marital status:    Tobacco Use   • Smoking status: Never   • Smokeless tobacco: Former   Substance and Sexual Activity   • Alcohol use: Not Currently   • Drug use: Not Currently   • Sexual activity: Yes       Family History:     No family history on file.    Review of Systems:     Review of Systems   Constitutional: Negative.    HENT: Negative.    Eyes: Negative.    Respiratory: Negative.    Cardiovascular: Negative.    Gastrointestinal: Negative.    Endocrine: Negative.    Musculoskeletal: Negative.    Allergic/Immunologic: Negative.    Neurological: Negative.    Hematological: Negative.    Psychiatric/Behavioral: Negative.        Physical Exam:     Physical Exam  Vitals and nursing note reviewed.   Constitutional:       Appearance: He is well-developed.   HENT:      Head: Normocephalic and atraumatic.   Eyes:      Conjunctiva/sclera: Conjunctivae normal.      Pupils: Pupils are equal, round, and reactive to light.   Cardiovascular:      Rate and Rhythm: Normal rate and regular rhythm.      Heart sounds: Normal heart sounds.   Pulmonary:      Effort:  Pulmonary effort is normal.      Breath sounds: Normal breath sounds.   Abdominal:      General: Bowel sounds are normal.      Palpations: Abdomen is soft.   Musculoskeletal:         General: Normal range of motion.      Cervical back: Normal range of motion.   Skin:     General: Skin is warm and dry.   Neurological:      Mental Status: He is alert and oriented to person, place, and time.      Deep Tendon Reflexes: Reflexes are normal and symmetric.   Psychiatric:         Behavior: Behavior normal.         Thought Content: Thought content normal.         Judgment: Judgment normal.         I have reviewed the following portions of the patient's history: Allergies, current medications, past family history, past medical history, past social history, past surgical history, problem list, and ROS and confirm it is accurate.    Recent Image (CT and/or KUB):      CT Abdomen and Pelvis: No results found for this or any previous visit.       CT Stone Protocol: No results found for this or any previous visit.       KUB: No results found for this or any previous visit.       Labs (past 3 months):      Admission on 12/16/2022, Discharged on 12/16/2022   Component Date Value Ref Range Status   • Glucose 12/16/2022 135 (H)  65 - 99 mg/dL Final   • BUN 12/16/2022 15  6 - 20 mg/dL Final   • Creatinine 12/16/2022 0.89  0.76 - 1.27 mg/dL Final   • Sodium 12/16/2022 139  136 - 145 mmol/L Final   • Potassium 12/16/2022 3.7  3.5 - 5.2 mmol/L Final    Slight hemolysis detected by analyzer. Results may be affected.   • Chloride 12/16/2022 104  98 - 107 mmol/L Final   • CO2 12/16/2022 26.1  22.0 - 29.0 mmol/L Final   • Calcium 12/16/2022 9.3  8.6 - 10.5 mg/dL Final   • Total Protein 12/16/2022 6.2  6.0 - 8.5 g/dL Final   • Albumin 12/16/2022 4.15  3.50 - 5.20 g/dL Final   • ALT (SGPT) 12/16/2022 30  1 - 41 U/L Final   • AST (SGOT) 12/16/2022 21  1 - 40 U/L Final   • Alkaline Phosphatase 12/16/2022 73  39 - 117 U/L Final   • Total Bilirubin  12/16/2022 0.2  0.0 - 1.2 mg/dL Final   • Globulin 12/16/2022 2.1  gm/dL Final   • A/G Ratio 12/16/2022 2.0  g/dL Final   • BUN/Creatinine Ratio 12/16/2022 16.9  7.0 - 25.0 Final   • Anion Gap 12/16/2022 8.9  5.0 - 15.0 mmol/L Final   • eGFR 12/16/2022 116.0  >60.0 mL/min/1.73 Final    National Kidney Foundation and American Society of Nephrology (ASN) Task Force recommended calculation based on the Chronic Kidney Disease Epidemiology Collaboration (CKD-EPI) equation refit without adjustment for race.   • Color, UA 12/16/2022 Yellow  Yellow, Straw Final   • Appearance, UA 12/16/2022 Turbid (A)  Clear Final   • pH, UA 12/16/2022 8.0  5.0 - 8.0 Final   • Specific Gravity, UA 12/16/2022 1.019  1.005 - 1.030 Final   • Glucose, UA 12/16/2022 Negative  Negative Final   • Ketones, UA 12/16/2022 Negative  Negative Final   • Bilirubin, UA 12/16/2022 Negative  Negative Final   • Blood, UA 12/16/2022 Negative  Negative Final   • Protein, UA 12/16/2022 Negative  Negative Final   • Leuk Esterase, UA 12/16/2022 Trace (A)  Negative Final   • Nitrite, UA 12/16/2022 Negative  Negative Final   • Urobilinogen, UA 12/16/2022 1.0 E.U./dL  0.2 - 1.0 E.U./dL Final   • C-Reactive Protein 12/16/2022 <0.30  0.00 - 0.50 mg/dL Final   • WBC 12/16/2022 8.49  3.40 - 10.80 10*3/mm3 Final   • RBC 12/16/2022 4.23  4.14 - 5.80 10*6/mm3 Final   • Hemoglobin 12/16/2022 13.7  13.0 - 17.7 g/dL Final   • Hematocrit 12/16/2022 40.8  37.5 - 51.0 % Final   • MCV 12/16/2022 96.5  79.0 - 97.0 fL Final   • MCH 12/16/2022 32.4  26.6 - 33.0 pg Final   • MCHC 12/16/2022 33.6  31.5 - 35.7 g/dL Final   • RDW 12/16/2022 13.3  12.3 - 15.4 % Final   • RDW-SD 12/16/2022 47.8  37.0 - 54.0 fl Final   • MPV 12/16/2022 9.0  6.0 - 12.0 fL Final   • Platelets 12/16/2022 233  140 - 450 10*3/mm3 Final   • Neutrophil % 12/16/2022 26.0 (L)  42.7 - 76.0 % Final   • Lymphocyte % 12/16/2022 64.0 (H)  19.6 - 45.3 % Final    Few Atypical Lymphs Noted    • Monocyte % 12/16/2022 5.0   5.0 - 12.0 % Final   • Eosinophil % 12/16/2022 5.0  0.3 - 6.2 % Final   • Neutrophils Absolute 12/16/2022 2.21  1.70 - 7.00 10*3/mm3 Final   • Lymphocytes Absolute 12/16/2022 5.43 (H)  0.70 - 3.10 10*3/mm3 Final   • Monocytes Absolute 12/16/2022 0.42  0.10 - 0.90 10*3/mm3 Final   • Eosinophils Absolute 12/16/2022 0.42 (H)  0.00 - 0.40 10*3/mm3 Final   • Macrocytes 12/16/2022 Slight/1+  None Seen Final   • Large Platelets 12/16/2022 Slight/1+  None Seen Final   • RBC, UA 12/16/2022 3-5 (A)  None Seen, 0-2 /HPF Final   • WBC, UA 12/16/2022 3-5 (A)  None Seen, 0-2 /HPF Final    Urine culture not indicated.   • Bacteria, UA 12/16/2022 None Seen  None Seen /HPF Final   • Squamous Epithelial Cells, UA 12/16/2022 0-2  None Seen, 0-2 /HPF Final   • Hyaline Casts, UA 12/16/2022 None Seen  None Seen /LPF Final   • Methodology 12/16/2022 Automated Microscopy   Final   Admission on 11/17/2022, Discharged on 11/17/2022   Component Date Value Ref Range Status   • COVID19 11/17/2022 Detected (C)  Not Detected - Ref. Range Final   • Influenza A PCR 11/17/2022 Not Detected  Not Detected Final   • Influenza B PCR 11/17/2022 Not Detected  Not Detected Final   Admission on 10/19/2022, Discharged on 10/19/2022   Component Date Value Ref Range Status   • Glucose 10/19/2022 127 (H)  65 - 99 mg/dL Final   • BUN 10/19/2022 14  6 - 20 mg/dL Final   • Creatinine 10/19/2022 0.86  0.76 - 1.27 mg/dL Final   • Sodium 10/19/2022 141  136 - 145 mmol/L Final   • Potassium 10/19/2022 3.9  3.5 - 5.2 mmol/L Final   • Chloride 10/19/2022 104  98 - 107 mmol/L Final   • CO2 10/19/2022 26.8  22.0 - 29.0 mmol/L Final   • Calcium 10/19/2022 9.2  8.6 - 10.5 mg/dL Final   • Total Protein 10/19/2022 6.7  6.0 - 8.5 g/dL Final   • Albumin 10/19/2022 4.52  3.50 - 5.20 g/dL Final   • ALT (SGPT) 10/19/2022 37  1 - 41 U/L Final   • AST (SGOT) 10/19/2022 24  1 - 40 U/L Final   • Alkaline Phosphatase 10/19/2022 67  39 - 117 U/L Final   • Total Bilirubin 10/19/2022 0.5   0.0 - 1.2 mg/dL Final   • Globulin 10/19/2022 2.2  gm/dL Final   • A/G Ratio 10/19/2022 2.1  g/dL Final   • BUN/Creatinine Ratio 10/19/2022 16.3  7.0 - 25.0 Final   • Anion Gap 10/19/2022 10.2  5.0 - 15.0 mmol/L Final   • eGFR 10/19/2022 117.3  >60.0 mL/min/1.73 Final    National Kidney Foundation and American Society of Nephrology (ASN) Task Force recommended calculation based on the Chronic Kidney Disease Epidemiology Collaboration (CKD-EPI) equation refit without adjustment for race.   • C-Reactive Protein 10/19/2022 <0.30  0.00 - 0.50 mg/dL Final   • Color, UA 10/19/2022 Dark Yellow (A)  Yellow, Straw Final   • Appearance, UA 10/19/2022 Clear  Clear Final   • pH, UA 10/19/2022 6.0  5.0 - 8.0 Final   • Specific Gravity, UA 10/19/2022 1.029  1.005 - 1.030 Final   • Glucose, UA 10/19/2022 Negative  Negative Final   • Ketones, UA 10/19/2022 Negative  Negative Final   • Bilirubin, UA 10/19/2022 Negative  Negative Final   • Blood, UA 10/19/2022 Negative  Negative Final   • Protein, UA 10/19/2022 Negative  Negative Final   • Leuk Esterase, UA 10/19/2022 Negative  Negative Final   • Nitrite, UA 10/19/2022 Negative  Negative Final   • Urobilinogen, UA 10/19/2022 1.0 E.U./dL  0.2 - 1.0 E.U./dL Final   • Chlamydia DNA by PCR 10/19/2022 Not Detected  Not Detected Final   • Neisseria gonorrhoeae by PCR 10/19/2022 Not Detected  Not Detected Final   • THC, Screen, Urine 10/19/2022 Negative  Negative Final   • Phencyclidine (PCP), Urine 10/19/2022 Negative  Negative Final   • Cocaine Screen, Urine 10/19/2022 Negative  Negative Final   • Methamphetamine, Ur 10/19/2022 Positive (A)  Negative Final   • Opiate Screen 10/19/2022 Negative  Negative Final   • Amphetamine Screen, Urine 10/19/2022 Positive (A)  Negative Final   • Benzodiazepine Screen, Urine 10/19/2022 Negative  Negative Final   • Tricyclic Antidepressants Screen 10/19/2022 Negative  Negative Final   • Methadone Screen, Urine 10/19/2022 Negative  Negative Final   •  Barbiturates Screen, Urine 10/19/2022 Negative  Negative Final   • Oxycodone Screen, Urine 10/19/2022 Negative  Negative Final   • Propoxyphene Screen 10/19/2022 Negative  Negative Final   • Buprenorphine, Screen, Urine 10/19/2022 Negative  Negative Final   • WBC 10/19/2022 7.33  3.40 - 10.80 10*3/mm3 Final   • RBC 10/19/2022 4.55  4.14 - 5.80 10*6/mm3 Final   • Hemoglobin 10/19/2022 14.4  13.0 - 17.7 g/dL Final   • Hematocrit 10/19/2022 41.4  37.5 - 51.0 % Final   • MCV 10/19/2022 91.0  79.0 - 97.0 fL Final   • MCH 10/19/2022 31.6  26.6 - 33.0 pg Final   • MCHC 10/19/2022 34.8  31.5 - 35.7 g/dL Final   • RDW 10/19/2022 13.4  12.3 - 15.4 % Final   • RDW-SD 10/19/2022 45.0  37.0 - 54.0 fl Final   • MPV 10/19/2022 9.1  6.0 - 12.0 fL Final   • Platelets 10/19/2022 228  140 - 450 10*3/mm3 Final   • Neutrophil % 10/19/2022 39.2 (L)  42.7 - 76.0 % Final   • Lymphocyte % 10/19/2022 52.4 (H)  19.6 - 45.3 % Final   • Monocyte % 10/19/2022 5.7  5.0 - 12.0 % Final   • Eosinophil % 10/19/2022 2.0  0.3 - 6.2 % Final   • Basophil % 10/19/2022 0.4  0.0 - 1.5 % Final   • Immature Grans % 10/19/2022 0.3  0.0 - 0.5 % Final   • Neutrophils, Absolute 10/19/2022 2.87  1.70 - 7.00 10*3/mm3 Final   • Lymphocytes, Absolute 10/19/2022 3.84 (H)  0.70 - 3.10 10*3/mm3 Final   • Monocytes, Absolute 10/19/2022 0.42  0.10 - 0.90 10*3/mm3 Final   • Eosinophils, Absolute 10/19/2022 0.15  0.00 - 0.40 10*3/mm3 Final   • Basophils, Absolute 10/19/2022 0.03  0.00 - 0.20 10*3/mm3 Final   • Immature Grans, Absolute 10/19/2022 0.02  0.00 - 0.05 10*3/mm3 Final   • nRBC 10/19/2022 0.0  0.0 - 0.2 /100 WBC Final        Procedure:       Assessment/Plan:   Forme fruste inguinal hernia refer to general surgery  Prostatitis-we discussed the differential diagnosis of prostatitis including the National Institutes of Health classification system I through IV.  I discussed that type I prostatitis is acute bacterial prostatitis and associated with high fevers,  typically hematuria, and severe pain with voiding.  We discussed the fact that it necessitates 6 weeks of chronic antibiotics to be sure it doesn't turn into a chronic bacterial prostatitis that can have lifelong ramifications.  We discussed National Institutes of Health type II chronic bacterial prostatitis.  We discussed the fact that this a chronic bacterial infection of the prostate that often requires suppressive antibiotics.  We discussed type III being related more to nonspecific urethritis, as well as tight for being related to finding inflammation and a biopsy in the absence of symptomatology.  I discussed the diagnostic strategies including the VB 1 through 4 urine culture and discussed empiric therapy.  I emphasized that with the use of chronic antibiotics, I strongly recommended the concomitant use of probiotics.  Additionally, we discussed the various antibiotics used and the risks and benefits associated with each, particularly the use of long-term ciprofloxacin and the effect on joints and collagen in human beings.  Resolved                This document has been electronically signed by HANNA SEALS MD January 12, 2023 08:03 EST    Dictated Utilizing Dragon Dictation: Part of this note may be an electronic transcription/translation of spoken language to printed text using the Dragon Dictation System.

## 2023-01-24 ENCOUNTER — OFFICE VISIT (OUTPATIENT)
Dept: SURGERY | Facility: CLINIC | Age: 33
End: 2023-01-24
Payer: COMMERCIAL

## 2023-01-24 VITALS
BODY MASS INDEX: 26.48 KG/M2 | SYSTOLIC BLOOD PRESSURE: 130 MMHG | DIASTOLIC BLOOD PRESSURE: 86 MMHG | HEIGHT: 70 IN | WEIGHT: 185 LBS

## 2023-01-24 DIAGNOSIS — K40.91 UNILATERAL RECURRENT INGUINAL HERNIA WITHOUT OBSTRUCTION OR GANGRENE: Primary | ICD-10-CM

## 2023-01-24 PROCEDURE — 99204 OFFICE O/P NEW MOD 45 MIN: CPT | Performed by: SURGERY

## 2023-01-24 RX ORDER — SODIUM CHLORIDE 0.9 % (FLUSH) 0.9 %
10 SYRINGE (ML) INJECTION EVERY 12 HOURS SCHEDULED
Status: CANCELLED | OUTPATIENT
Start: 2023-02-10

## 2023-01-24 RX ORDER — SODIUM CHLORIDE 0.9 % (FLUSH) 0.9 %
10 SYRINGE (ML) INJECTION AS NEEDED
Status: CANCELLED | OUTPATIENT
Start: 2023-02-10

## 2023-01-24 RX ORDER — SODIUM CHLORIDE 9 MG/ML
40 INJECTION, SOLUTION INTRAVENOUS AS NEEDED
Status: CANCELLED | OUTPATIENT
Start: 2023-02-10

## 2023-02-03 ENCOUNTER — TELEPHONE (OUTPATIENT)
Dept: SURGERY | Facility: CLINIC | Age: 33
End: 2023-02-03
Payer: COMMERCIAL

## 2023-02-03 NOTE — TELEPHONE ENCOUNTER
Patient is aware of all surgery info and has no questions at this time.     DL 02/03/2023 10:57am

## 2023-02-07 NOTE — DISCHARGE INSTRUCTIONS

## 2023-02-08 ENCOUNTER — PRE-ADMISSION TESTING (OUTPATIENT)
Dept: PREADMISSION TESTING | Facility: HOSPITAL | Age: 33
End: 2023-02-08
Payer: COMMERCIAL

## 2023-02-08 LAB
ANION GAP SERPL CALCULATED.3IONS-SCNC: 11.5 MMOL/L (ref 5–15)
BUN SERPL-MCNC: 13 MG/DL (ref 6–20)
BUN/CREAT SERPL: 17.8 (ref 7–25)
CALCIUM SPEC-SCNC: 9.1 MG/DL (ref 8.6–10.5)
CHLORIDE SERPL-SCNC: 104 MMOL/L (ref 98–107)
CO2 SERPL-SCNC: 23.5 MMOL/L (ref 22–29)
CREAT SERPL-MCNC: 0.73 MG/DL (ref 0.76–1.27)
DEPRECATED RDW RBC AUTO: 43.6 FL (ref 37–54)
EGFRCR SERPLBLD CKD-EPI 2021: 123.2 ML/MIN/1.73
ERYTHROCYTE [DISTWIDTH] IN BLOOD BY AUTOMATED COUNT: 12.7 % (ref 12.3–15.4)
GLUCOSE SERPL-MCNC: 191 MG/DL (ref 65–99)
HCT VFR BLD AUTO: 46.5 % (ref 37.5–51)
HGB BLD-MCNC: 15.7 G/DL (ref 13–17.7)
MCH RBC QN AUTO: 31.5 PG (ref 26.6–33)
MCHC RBC AUTO-ENTMCNC: 33.8 G/DL (ref 31.5–35.7)
MCV RBC AUTO: 93.2 FL (ref 79–97)
PLATELET # BLD AUTO: 308 10*3/MM3 (ref 140–450)
PMV BLD AUTO: 9.1 FL (ref 6–12)
POTASSIUM SERPL-SCNC: 3.9 MMOL/L (ref 3.5–5.2)
RBC # BLD AUTO: 4.99 10*6/MM3 (ref 4.14–5.8)
SODIUM SERPL-SCNC: 139 MMOL/L (ref 136–145)
WBC NRBC COR # BLD: 8.89 10*3/MM3 (ref 3.4–10.8)

## 2023-02-08 PROCEDURE — 36415 COLL VENOUS BLD VENIPUNCTURE: CPT

## 2023-02-08 PROCEDURE — 85027 COMPLETE CBC AUTOMATED: CPT

## 2023-02-08 PROCEDURE — 80048 BASIC METABOLIC PNL TOTAL CA: CPT

## 2023-02-10 ENCOUNTER — APPOINTMENT (OUTPATIENT)
Dept: GENERAL RADIOLOGY | Facility: HOSPITAL | Age: 33
End: 2023-02-10
Payer: COMMERCIAL

## 2023-02-10 ENCOUNTER — ANESTHESIA (OUTPATIENT)
Dept: PERIOP | Facility: HOSPITAL | Age: 33
End: 2023-02-10
Payer: COMMERCIAL

## 2023-02-10 ENCOUNTER — HOSPITAL ENCOUNTER (OUTPATIENT)
Facility: HOSPITAL | Age: 33
Setting detail: HOSPITAL OUTPATIENT SURGERY
Discharge: HOME | End: 2023-02-10
Attending: SURGERY | Admitting: SURGERY
Payer: COMMERCIAL

## 2023-02-10 ENCOUNTER — ANESTHESIA EVENT (OUTPATIENT)
Dept: PERIOP | Facility: HOSPITAL | Age: 33
End: 2023-02-10
Payer: COMMERCIAL

## 2023-02-10 VITALS
OXYGEN SATURATION: 98 % | BODY MASS INDEX: 25.77 KG/M2 | SYSTOLIC BLOOD PRESSURE: 124 MMHG | RESPIRATION RATE: 18 BRPM | HEIGHT: 70 IN | DIASTOLIC BLOOD PRESSURE: 81 MMHG | HEART RATE: 83 BPM | WEIGHT: 180 LBS | TEMPERATURE: 97.5 F

## 2023-02-10 DIAGNOSIS — K40.91 UNILATERAL RECURRENT INGUINAL HERNIA WITHOUT OBSTRUCTION OR GANGRENE: ICD-10-CM

## 2023-02-10 PROCEDURE — 25010000002 PROPOFOL 10 MG/ML EMULSION: Performed by: NURSE ANESTHETIST, CERTIFIED REGISTERED

## 2023-02-10 PROCEDURE — 49650 LAP ING HERNIA REPAIR INIT: CPT | Performed by: SURGERY

## 2023-02-10 PROCEDURE — 25010000002 NEOSTIGMINE 10 MG/10ML SOLUTION: Performed by: NURSE ANESTHETIST, CERTIFIED REGISTERED

## 2023-02-10 PROCEDURE — 25010000002 CEFAZOLIN PER 500 MG: Performed by: SURGERY

## 2023-02-10 PROCEDURE — 25010000002 ONDANSETRON PER 1 MG: Performed by: NURSE ANESTHETIST, CERTIFIED REGISTERED

## 2023-02-10 PROCEDURE — 25010000002 ROPIVACAINE PER 1 MG: Performed by: ANESTHESIOLOGY

## 2023-02-10 PROCEDURE — 25010000002 DEXAMETHASONE PER 1 MG: Performed by: ANESTHESIOLOGY

## 2023-02-10 PROCEDURE — 25010000002 BUPRENORPHINE PER 0.1 MG: Performed by: ANESTHESIOLOGY

## 2023-02-10 PROCEDURE — 25010000002 MIDAZOLAM PER 1 MG: Performed by: NURSE ANESTHETIST, CERTIFIED REGISTERED

## 2023-02-10 PROCEDURE — 0 MEPERIDINE PER 100 MG: Performed by: NURSE ANESTHETIST, CERTIFIED REGISTERED

## 2023-02-10 PROCEDURE — C1781 MESH (IMPLANTABLE): HCPCS | Performed by: SURGERY

## 2023-02-10 PROCEDURE — 25010000002 KETOROLAC TROMETHAMINE PER 15 MG: Performed by: NURSE ANESTHETIST, CERTIFIED REGISTERED

## 2023-02-10 PROCEDURE — 25010000002 FENTANYL CITRATE (PF) 50 MCG/ML SOLUTION: Performed by: NURSE ANESTHETIST, CERTIFIED REGISTERED

## 2023-02-10 DEVICE — ABSORBABLE WOUND CLOSURE DEVICE
Type: IMPLANTABLE DEVICE | Site: ABDOMEN | Status: FUNCTIONAL
Brand: V-LOC 90

## 2023-02-10 DEVICE — 3DMAX™ MID ANATOMICAL MESH, LARGE, RIGHT, 4” X 6”, 10 X 16 CM
Type: IMPLANTABLE DEVICE | Site: INGUINAL | Status: FUNCTIONAL
Brand: 3DMAX™ MID ANATOMICAL MESH

## 2023-02-10 DEVICE — 3DMAX™ MID ANATOMICAL MESH, LARGE, LEFT, 4" X 6", 10 X 16 CM
Type: IMPLANTABLE DEVICE | Site: INGUINAL | Status: FUNCTIONAL
Brand: 3DMAX™ MID ANATOMICAL MESH

## 2023-02-10 RX ORDER — SODIUM CHLORIDE 0.9 % (FLUSH) 0.9 %
10 SYRINGE (ML) INJECTION AS NEEDED
Status: DISCONTINUED | OUTPATIENT
Start: 2023-02-10 | End: 2023-02-10 | Stop reason: HOSPADM

## 2023-02-10 RX ORDER — MIDAZOLAM HYDROCHLORIDE 1 MG/ML
1 INJECTION INTRAMUSCULAR; INTRAVENOUS
Status: DISCONTINUED | OUTPATIENT
Start: 2023-02-10 | End: 2023-02-10 | Stop reason: HOSPADM

## 2023-02-10 RX ORDER — PROPOFOL 10 MG/ML
VIAL (ML) INTRAVENOUS AS NEEDED
Status: DISCONTINUED | OUTPATIENT
Start: 2023-02-10 | End: 2023-02-10 | Stop reason: SURG

## 2023-02-10 RX ORDER — SODIUM CHLORIDE 0.9 % (FLUSH) 0.9 %
10 SYRINGE (ML) INJECTION EVERY 12 HOURS SCHEDULED
Status: DISCONTINUED | OUTPATIENT
Start: 2023-02-10 | End: 2023-02-10 | Stop reason: HOSPADM

## 2023-02-10 RX ORDER — BUPRENORPHINE HYDROCHLORIDE 0.32 MG/ML
INJECTION INTRAMUSCULAR; INTRAVENOUS
Status: COMPLETED | OUTPATIENT
Start: 2023-02-10 | End: 2023-02-10

## 2023-02-10 RX ORDER — ACETAMINOPHEN 325 MG/1
650 TABLET ORAL EVERY 4 HOURS PRN
Qty: 30 TABLET | Refills: 0 | Status: SHIPPED | OUTPATIENT
Start: 2023-02-10

## 2023-02-10 RX ORDER — OXYCODONE HYDROCHLORIDE AND ACETAMINOPHEN 5; 325 MG/1; MG/1
1 TABLET ORAL ONCE AS NEEDED
Status: COMPLETED | OUTPATIENT
Start: 2023-02-10 | End: 2023-02-10

## 2023-02-10 RX ORDER — SODIUM CHLORIDE 9 MG/ML
40 INJECTION, SOLUTION INTRAVENOUS AS NEEDED
Status: DISCONTINUED | OUTPATIENT
Start: 2023-02-10 | End: 2023-02-10 | Stop reason: HOSPADM

## 2023-02-10 RX ORDER — ROPIVACAINE HYDROCHLORIDE 5 MG/ML
INJECTION, SOLUTION EPIDURAL; INFILTRATION; PERINEURAL
Status: COMPLETED | OUTPATIENT
Start: 2023-02-10 | End: 2023-02-10

## 2023-02-10 RX ORDER — MIDAZOLAM HYDROCHLORIDE 1 MG/ML
INJECTION INTRAMUSCULAR; INTRAVENOUS AS NEEDED
Status: DISCONTINUED | OUTPATIENT
Start: 2023-02-10 | End: 2023-02-10 | Stop reason: SURG

## 2023-02-10 RX ORDER — TRAMADOL HYDROCHLORIDE 50 MG/1
50 TABLET ORAL EVERY 8 HOURS PRN
Qty: 8 TABLET | Refills: 0 | Status: SHIPPED | OUTPATIENT
Start: 2023-02-10

## 2023-02-10 RX ORDER — ONDANSETRON 2 MG/ML
4 INJECTION INTRAMUSCULAR; INTRAVENOUS AS NEEDED
Status: DISCONTINUED | OUTPATIENT
Start: 2023-02-10 | End: 2023-02-10 | Stop reason: HOSPADM

## 2023-02-10 RX ORDER — MEPERIDINE HYDROCHLORIDE 50 MG/ML
12.5 INJECTION INTRAMUSCULAR; INTRAVENOUS; SUBCUTANEOUS
Status: COMPLETED | OUTPATIENT
Start: 2023-02-10 | End: 2023-02-10

## 2023-02-10 RX ORDER — NEOSTIGMINE METHYLSULFATE 1 MG/ML
INJECTION, SOLUTION INTRAVENOUS AS NEEDED
Status: DISCONTINUED | OUTPATIENT
Start: 2023-02-10 | End: 2023-02-10 | Stop reason: SURG

## 2023-02-10 RX ORDER — SODIUM CHLORIDE, SODIUM LACTATE, POTASSIUM CHLORIDE, CALCIUM CHLORIDE 600; 310; 30; 20 MG/100ML; MG/100ML; MG/100ML; MG/100ML
100 INJECTION, SOLUTION INTRAVENOUS ONCE AS NEEDED
Status: DISCONTINUED | OUTPATIENT
Start: 2023-02-10 | End: 2023-02-10 | Stop reason: HOSPADM

## 2023-02-10 RX ORDER — FENTANYL CITRATE 50 UG/ML
50 INJECTION, SOLUTION INTRAMUSCULAR; INTRAVENOUS
Status: DISCONTINUED | OUTPATIENT
Start: 2023-02-10 | End: 2023-02-10 | Stop reason: HOSPADM

## 2023-02-10 RX ORDER — IBUPROFEN 600 MG/1
600 TABLET ORAL EVERY 6 HOURS PRN
Qty: 30 TABLET | Refills: 0 | Status: SHIPPED | OUTPATIENT
Start: 2023-02-10

## 2023-02-10 RX ORDER — SODIUM CHLORIDE, SODIUM LACTATE, POTASSIUM CHLORIDE, CALCIUM CHLORIDE 600; 310; 30; 20 MG/100ML; MG/100ML; MG/100ML; MG/100ML
INJECTION, SOLUTION INTRAVENOUS CONTINUOUS PRN
Status: DISCONTINUED | OUTPATIENT
Start: 2023-02-10 | End: 2023-02-10 | Stop reason: SURG

## 2023-02-10 RX ORDER — SODIUM CHLORIDE, SODIUM LACTATE, POTASSIUM CHLORIDE, CALCIUM CHLORIDE 600; 310; 30; 20 MG/100ML; MG/100ML; MG/100ML; MG/100ML
125 INJECTION, SOLUTION INTRAVENOUS ONCE
Status: COMPLETED | OUTPATIENT
Start: 2023-02-10 | End: 2023-02-10

## 2023-02-10 RX ORDER — GLYCOPYRROLATE 0.2 MG/ML
INJECTION INTRAMUSCULAR; INTRAVENOUS AS NEEDED
Status: DISCONTINUED | OUTPATIENT
Start: 2023-02-10 | End: 2023-02-10 | Stop reason: SURG

## 2023-02-10 RX ORDER — MAGNESIUM HYDROXIDE 1200 MG/15ML
LIQUID ORAL AS NEEDED
Status: DISCONTINUED | OUTPATIENT
Start: 2023-02-10 | End: 2023-02-10 | Stop reason: HOSPADM

## 2023-02-10 RX ORDER — KETOROLAC TROMETHAMINE 30 MG/ML
INJECTION, SOLUTION INTRAMUSCULAR; INTRAVENOUS AS NEEDED
Status: DISCONTINUED | OUTPATIENT
Start: 2023-02-10 | End: 2023-02-10 | Stop reason: SURG

## 2023-02-10 RX ORDER — FAMOTIDINE 10 MG/ML
INJECTION, SOLUTION INTRAVENOUS AS NEEDED
Status: DISCONTINUED | OUTPATIENT
Start: 2023-02-10 | End: 2023-02-10 | Stop reason: SURG

## 2023-02-10 RX ORDER — IPRATROPIUM BROMIDE AND ALBUTEROL SULFATE 2.5; .5 MG/3ML; MG/3ML
3 SOLUTION RESPIRATORY (INHALATION) ONCE AS NEEDED
Status: DISCONTINUED | OUTPATIENT
Start: 2023-02-10 | End: 2023-02-10 | Stop reason: HOSPADM

## 2023-02-10 RX ORDER — VECURONIUM BROMIDE 1 MG/ML
INJECTION, POWDER, LYOPHILIZED, FOR SOLUTION INTRAVENOUS AS NEEDED
Status: DISCONTINUED | OUTPATIENT
Start: 2023-02-10 | End: 2023-02-10 | Stop reason: SURG

## 2023-02-10 RX ORDER — ONDANSETRON 2 MG/ML
INJECTION INTRAMUSCULAR; INTRAVENOUS AS NEEDED
Status: DISCONTINUED | OUTPATIENT
Start: 2023-02-10 | End: 2023-02-10 | Stop reason: SURG

## 2023-02-10 RX ORDER — LIDOCAINE HYDROCHLORIDE 20 MG/ML
INJECTION, SOLUTION EPIDURAL; INFILTRATION; INTRACAUDAL; PERINEURAL AS NEEDED
Status: DISCONTINUED | OUTPATIENT
Start: 2023-02-10 | End: 2023-02-10 | Stop reason: SURG

## 2023-02-10 RX ORDER — DEXAMETHASONE SODIUM PHOSPHATE 4 MG/ML
INJECTION, SOLUTION INTRA-ARTICULAR; INTRALESIONAL; INTRAMUSCULAR; INTRAVENOUS; SOFT TISSUE
Status: COMPLETED | OUTPATIENT
Start: 2023-02-10 | End: 2023-02-10

## 2023-02-10 RX ORDER — FENTANYL CITRATE 50 UG/ML
INJECTION, SOLUTION INTRAMUSCULAR; INTRAVENOUS AS NEEDED
Status: DISCONTINUED | OUTPATIENT
Start: 2023-02-10 | End: 2023-02-10 | Stop reason: SURG

## 2023-02-10 RX ADMIN — BUPRENORPHINE HYDROCHLORIDE 0.3 MG: 0.32 INJECTION INTRAMUSCULAR; INTRAVENOUS at 09:59

## 2023-02-10 RX ADMIN — KETOROLAC TROMETHAMINE 30 MG: 30 INJECTION, SOLUTION INTRAMUSCULAR; INTRAVENOUS at 11:14

## 2023-02-10 RX ADMIN — CEFAZOLIN 2 G: 2 INJECTION, POWDER, FOR SOLUTION INTRAMUSCULAR; INTRAVENOUS at 09:53

## 2023-02-10 RX ADMIN — ONDANSETRON 4 MG: 2 INJECTION INTRAMUSCULAR; INTRAVENOUS at 10:00

## 2023-02-10 RX ADMIN — FENTANYL CITRATE 100 MCG: 50 INJECTION INTRAMUSCULAR; INTRAVENOUS at 09:55

## 2023-02-10 RX ADMIN — OXYCODONE HYDROCHLORIDE AND ACETAMINOPHEN 1 TABLET: 5; 325 TABLET ORAL at 12:01

## 2023-02-10 RX ADMIN — MIDAZOLAM HYDROCHLORIDE 2 MG: 1 INJECTION, SOLUTION INTRAMUSCULAR; INTRAVENOUS at 09:48

## 2023-02-10 RX ADMIN — LIDOCAINE HYDROCHLORIDE 60 MG: 20 INJECTION, SOLUTION EPIDURAL; INFILTRATION; INTRACAUDAL; PERINEURAL at 09:55

## 2023-02-10 RX ADMIN — FAMOTIDINE 20 MG: 10 INJECTION, SOLUTION INTRAVENOUS at 09:50

## 2023-02-10 RX ADMIN — VECURONIUM BROMIDE 3 MG: 1 INJECTION, POWDER, LYOPHILIZED, FOR SOLUTION INTRAVENOUS at 09:55

## 2023-02-10 RX ADMIN — MEPERIDINE HYDROCHLORIDE 12.5 MG: 50 INJECTION INTRAMUSCULAR; INTRAVENOUS; SUBCUTANEOUS at 12:06

## 2023-02-10 RX ADMIN — MEPERIDINE HYDROCHLORIDE 12.5 MG: 50 INJECTION INTRAMUSCULAR; INTRAVENOUS; SUBCUTANEOUS at 12:13

## 2023-02-10 RX ADMIN — GLYCOPYRROLATE 0.4 MG: 0.2 INJECTION INTRAMUSCULAR; INTRAVENOUS at 11:11

## 2023-02-10 RX ADMIN — NEOSTIGMINE METHYLSULFATE 3 MG: 0.5 INJECTION INTRAVENOUS at 11:11

## 2023-02-10 RX ADMIN — ROPIVACAINE HYDROCHLORIDE 242 MG: 5 INJECTION, SOLUTION EPIDURAL; INFILTRATION; PERINEURAL at 09:59

## 2023-02-10 RX ADMIN — SODIUM CHLORIDE, POTASSIUM CHLORIDE, SODIUM LACTATE AND CALCIUM CHLORIDE 125 ML/HR: 600; 310; 30; 20 INJECTION, SOLUTION INTRAVENOUS at 08:42

## 2023-02-10 RX ADMIN — PROPOFOL 200 MG: 10 INJECTION, EMULSION INTRAVENOUS at 09:55

## 2023-02-10 RX ADMIN — SODIUM CHLORIDE, POTASSIUM CHLORIDE, SODIUM LACTATE AND CALCIUM CHLORIDE: 600; 310; 30; 20 INJECTION, SOLUTION INTRAVENOUS at 09:48

## 2023-02-10 RX ADMIN — DEXAMETHASONE SODIUM PHOSPHATE 8 MG: 4 INJECTION, SOLUTION INTRA-ARTICULAR; INTRALESIONAL; INTRAMUSCULAR; INTRAVENOUS; SOFT TISSUE at 09:59

## 2023-02-10 NOTE — ANESTHESIA POSTPROCEDURE EVALUATION
Patient: Lacho Estrada    Procedure Summary     Date: 02/10/23 Room / Location: [facility] OR  /  [facility] OR    Anesthesia Start: 0948 Anesthesia Stop: 1123    Procedure: BILATERAL INGUINAL HERNIA REPAIR LAPAROSCOPIC WITH DAVINCI ROBOT (Bilateral: Abdomen) Diagnosis:       Unilateral recurrent inguinal hernia without obstruction or gangrene      (Unilateral recurrent inguinal hernia without obstruction or gangrene [K40.91])    Surgeons: Rell Alvarado MD Provider: Caio Gonzalez MD    Anesthesia Type: general with block ASA Status: 2          Anesthesia Type: general with block    Vitals  Vitals Value Taken Time   /85 02/10/23 1155   Temp 97.5 °F (36.4 °C) 02/10/23 1125   Pulse 72 02/10/23 1155   Resp 12 02/10/23 1155   SpO2 96 % 02/10/23 1155           Post Anesthesia Care and Evaluation    Patient location during evaluation: PHASE II  Patient participation: complete - patient participated  Level of consciousness: awake and alert  Pain score: 1  Pain management: adequate    Airway patency: patent  Anesthetic complications: No anesthetic complications  PONV Status: controlled  Cardiovascular status: acceptable  Respiratory status: acceptable  Hydration status: euvolemic  No anesthesia care post op

## 2023-02-10 NOTE — ANESTHESIA PROCEDURE NOTES
Airway  Urgency: elective    Date/Time: 2/10/2023 9:55 AM  Airway not difficult    General Information and Staff    Patient location during procedure: OR  CRNA/CAA: Elisa Watkins CRNA    Indications and Patient Condition  Indications for airway management: airway protection    Preoxygenated: yes  MILS maintained throughout  Mask difficulty assessment: 1 - vent by mask    Final Airway Details  Final airway type: endotracheal airway      Successful airway: ETT  Cuffed: yes   Successful intubation technique: direct laryngoscopy  Facilitating devices/methods: cricoid pressure  Endotracheal tube insertion site: oral  Blade: Janette  Blade size: 3  ETT size (mm): 7.5  Cormack-Lehane Classification: grade IIa - partial view of glottis  Placement verified by: chest auscultation   Measured from: lips  ETT/EBT  to lips (cm): 21  Number of attempts at approach: 1  Assessment: lips, teeth, and gum same as pre-op and atraumatic intubation

## 2023-02-10 NOTE — ANESTHESIA PREPROCEDURE EVALUATION
Anesthesia Evaluation     no history of anesthetic complications:  NPO Solid Status: > 8 hours  NPO Liquid Status: > 8 hours           Airway   Mallampati: II  TM distance: >3 FB  Neck ROM: full  No difficulty expected  Dental    (+) poor dentition    Pulmonary - normal exam   Cardiovascular - normal exam        Neuro/Psych  GI/Hepatic/Renal/Endo    (+)  GERD,      Musculoskeletal     Abdominal  - normal exam   Substance History      OB/GYN          Other                        Anesthesia Plan    ASA 2     general with block     intravenous induction     Anesthetic plan, risks, benefits, and alternatives have been provided, discussed and informed consent has been obtained with: patient.        CODE STATUS:

## 2023-02-10 NOTE — ANESTHESIA PROCEDURE NOTES
"Peripheral Block      Patient reassessed immediately prior to procedure    Patient location during procedure: OR  Start time: 2/10/2023 9:59 AM  Stop time: 2/10/2023 10:01 AM  Reason for block: at surgeon's request and post-op pain management  Performed by  CRNA/CAA: Elisa Watkins CRNA  Preanesthetic Checklist  Completed: patient identified, IV checked, site marked, risks and benefits discussed, surgical consent, monitors and equipment checked, pre-op evaluation and timeout performed  Prep:  Pt Position: supine  Sterile barriers:cap, gloves, sterile barriers and mask  Prep: ChloraPrep  Patient monitoring: blood pressure monitoring, continuous pulse oximetry and EKG  Procedure    Nursing cardiac assessment comments yes: Sedation, GA, Spinal,Epidural   Performed under: general  Guidance:ultrasound guided    ULTRASOUND INTERPRETATION.  Using ultrasound guidance a 20 G (20g 4\" Stimuplex) gauge needle was placed in close proximity to the nerve, at which point, under ultrasound guidance anesthetic was injected in the area of the nerve and spread of the anesthesia was seen on ultrasound in close proximity thereto.  There were no abnormalities seen on ultrasound; a digital image was taken; and the patient tolerated the procedure with no complications. Images:still images obtained, printed/placed on chart    Laterality:Bilateral  Block Type:TAP  Injection Technique:single-shot  Needle Type:short-bevel  Needle Gauge:20 G  Resistance on Injection: none    Medications Used: ropivacaine (NAROPIN) injection 0.5 % - Peripheral Nerve   242 mg - 2/10/2023 9:59:00 AM  buprenorphine (BUPRENEX) injection - Injection   0.3 mg - 2/10/2023 9:59:00 AM  dexamethasone (DECADRON) injection - Injection   8 mg - 2/10/2023 9:59:00 AM      Medications  Preservative Free Saline:6ml  Comment:Block Injection:  Total volume divided equally between Right and Left block        Post Assessment  Injection Assessment: negative aspiration for heme, " incremental injection and no paresthesia on injection  Patient Tolerance:comfortable throughout block  Complications:no  Additional Notes  The pt was in the supine position under general anesthesia.    Under Ultrasound guidance, a BBraun nch 360 degree needle was advanced with Normal Saline hydro dissection of tissue.  The Internal Oblique and Transversus Abdominus muscles where visualized.  At or before the aponeurosis of Internal Oblique, local anesthetic spread was visualized in the Transversus Abdominus Plane. Injection was made incrementally with aspiration every 5 mls.  There was no  intravascular injection,  injection pressure was normal, there was no neural injection, and the procedure was completed without difficulty. The same procedure was completed for left and right sided tap blocks. Thank You.

## 2023-02-10 NOTE — OP NOTE
INGUINAL HERNIA REPAIR LAPAROSCOPIC WITH DAVINCI ROBOT  Procedure Note    Lacho Estrada  2/10/2023    Pre-op Diagnosis:   Unilateral recurrent inguinal hernia without obstruction or gangrene [K40.91]    Post-op Diagnosis:   Bilateral inguinal hernia    Indications: See above    Procedure(s):  BILATERAL INGUINAL HERNIA REPAIR LAPAROSCOPIC WITH DAVINCI ROBOT    Surgeon(s):  Rell Alvarado MD    Anesthesia: General    Staff:   Circulator: Lori Mckeon RN  Scrub Person: Supa Powell  Vendor Representative: Ashanti To  Assistant: Chepe Downing    Findings: Bilateral direct inguinal hernia.  Enlarged right inguinal lymph node    Operative Procedure: The patient was taken to operating suite and placed supine on the operating table.  Bilateral sequential compression devices were applied and general endotracheal anesthesia administered.  A Mcdermott catheter was inserted.  The abdomen was prepped and draped in usual sterile fashion.  Preoperative antibiotics were confirmed.  Timeout procedure was performed.  A Veress needle was inserted at Welsh's point. Saline drop test confirmed entry into the peritoneal space.  Pneumoperitoneum was established.  At this time a supraumbilical optical trocar was inserted in the midline.  Laparoscopic survey showed evidence of bilateral direct inguinal hernia containing preperitoneal fat.  The optical trocar was then converted to a 12 mm robotic trocar.  In the anterior axillary line bilaterally at the level of the supraumbilical trocar 8 mm robotic trocars were placed.  At this time the robot was docked and the laparoscope was inserted.  After determining target anatomy the robot was targeted and the fenestrated bipolar grasper and scissors were placed into the abdominal space under visualization.  With all instruments docked I then exited the sterile field and entered the robotic console.  Beginning on the right a peritoneal flap was created from the anterior superior  iliac spine to the medial umbilical vein.  The peritoneal flap was then dissected free with blunt and sharp dissection inferiorly to the pubic tubercle.  Dissection was somewhat difficult as the patient had had previous open inguinal hernia repair as a child as well as appendectomy.  The pubic tubercle was exposed across the midline.  Dissection was carried laterally along the myopectineal orifice exposing the entirety of the myopectineal orifice with no evidence of femoral hernia.  Laterally the space was created to allow adequate space for mesh placement.  At this time the cord structures were identified and dissected free circumferentially from the herniated peritoneum.  At all hernia defects the peritoneum was dissected free and removed from the hernia defects and reflected back off the cord structures to allow adequate space for mesh placement.  Inspection for cord lipomas was made and there were none.  An enlarged lymph node in the inguinal region was removed and sent for pathology.  Following completion of the dissection attention was turned to contralateral dissection.  In a similar fashion a peritoneal flap was made from the anterior superior iliac spine to the medial umbilical vein.  The flap was carried down inferiorly with blunt and sharp dissection to the pubic tubercle.  The contralateral space was entered crossing the midline.  Dissection was carried out laterally to allow adequate space for mesh placement.  The cord structures were identified with care to preserve the vas deferens and this was dissected free from the herniated peritoneal tissue.  At all hernia defects, the herniated peritoneum was dissected free from the defects with no evidence of a femoral hernia.  The herniated peritoneum was then reflected posteriorly to allow adequate space for mesh placement.  At the completion of the dissection the myopectineal orifice was completely visualized with no evidence of femoral hernia and at this  time bilateral mesh placement began.  A large 3DMax mesh was placed first on the right hernia space and anchored to the Rigoberto's ligament medially with a Vicryl suture.  The mesh covered all hernia defects with no evidence of infolding with approximation of the peritoneal flap.  The peritoneal flap was then closed with a running 2-0 V-loc suture with no evidence of peritoneal tears or exposure of the mesh.  The left-sided 3DMax mesh was placed in a similar fashion into the peritoneal pocket and secured to the Rigoberto's ligament with a Vicryl suture medially.  The mesh covered all hernia defects with no evidence of folding with approximation of the peritoneal flap.  The peritoneal flap was then approximated with a running 2-0 V-Loc suture with no evidence of peritoneal tear or exposed mesh.  At this time all needles were removed by my assistant and I exited the robotic console and entered the operative field.  The robot was undocked and removed and at this time all trocars were removed under direct visualization and pneumoperitoneum was evacuated.  All sponge lap and needle counts were correct.  The supraumbilical fascial defect was closed with a figure-of-eight Vicryl suture and all skin incisions closed with Monocryl subcuticular suture and dressed with SureClose.  The patient's Mcdermott catheter was removed and the bilateral testes were reduced into the scrotum.  Patient tolerated the procedure well.    Estimated Blood Loss: 5 mL    Specimens: Right inguinal lymph                 Drains: None    Grafts or Implants: Preperitoneal bilateral large 3D max inguinal mesh    Complications: None      Rell Alvarado MD     Date: 2/10/2023  Time: 11:20 EST

## 2023-03-01 ENCOUNTER — OFFICE VISIT (OUTPATIENT)
Dept: SURGERY | Facility: CLINIC | Age: 33
End: 2023-03-01
Payer: COMMERCIAL

## 2023-03-01 VITALS — HEIGHT: 70 IN | WEIGHT: 180 LBS | BODY MASS INDEX: 25.77 KG/M2

## 2023-03-01 DIAGNOSIS — K40.91 UNILATERAL RECURRENT INGUINAL HERNIA WITHOUT OBSTRUCTION OR GANGRENE: Primary | ICD-10-CM

## 2023-03-01 PROCEDURE — 99024 POSTOP FOLLOW-UP VISIT: CPT | Performed by: SURGERY

## 2023-03-01 NOTE — PROGRESS NOTES
Subjective   Lacho Estrada is a 32 y.o. male  is here today for follow-up.         Lacho Estrada is a 32 y.o. male here for follow up after robotic inguinal hernia repair with mesh.  The patient is doing well without complication or abnormality.  Incisions are healing well.      Assessment     Diagnoses and all orders for this visit:    1. Unilateral recurrent inguinal hernia without obstruction or gangrene (Primary)      Lacho Estrada is a 32 y.o. male doing well after robotic inguinal hernia repair with mesh.  The patient is doing well without complication or abnormality.  He will follow-up as needed and will complete a full 6-week lifting restriction of 10 pounds 6 weeks from the date of surgery

## 2023-05-16 ENCOUNTER — OFFICE VISIT (OUTPATIENT)
Dept: UROLOGY | Facility: CLINIC | Age: 33
End: 2023-05-16
Payer: COMMERCIAL

## 2023-05-16 VITALS
HEART RATE: 98 BPM | DIASTOLIC BLOOD PRESSURE: 103 MMHG | BODY MASS INDEX: 24.48 KG/M2 | SYSTOLIC BLOOD PRESSURE: 144 MMHG | HEIGHT: 70 IN | WEIGHT: 171 LBS

## 2023-05-16 DIAGNOSIS — N41.1 PROSTATITIS, CHRONIC: Primary | ICD-10-CM

## 2023-05-16 LAB
BILIRUB BLD-MCNC: ABNORMAL MG/DL
CLARITY, POC: CLEAR
COLOR UR: YELLOW
EXPIRATION DATE: ABNORMAL
GLUCOSE UR STRIP-MCNC: NEGATIVE MG/DL
KETONES UR QL: ABNORMAL
LEUKOCYTE EST, POC: ABNORMAL
Lab: ABNORMAL
NITRITE UR-MCNC: NEGATIVE MG/ML
PH UR: 7 [PH] (ref 5–8)
PROT UR STRIP-MCNC: ABNORMAL MG/DL
RBC # UR STRIP: NEGATIVE /UL
SP GR UR: 1.01 (ref 1–1.03)
UROBILINOGEN UR QL: NORMAL

## 2023-05-16 PROCEDURE — 51798 US URINE CAPACITY MEASURE: CPT

## 2023-05-16 PROCEDURE — 81003 URINALYSIS AUTO W/O SCOPE: CPT

## 2023-05-16 PROCEDURE — 99213 OFFICE O/P EST LOW 20 MIN: CPT

## 2023-05-16 PROCEDURE — 87086 URINE CULTURE/COLONY COUNT: CPT

## 2023-05-16 RX ORDER — SULFAMETHOXAZOLE AND TRIMETHOPRIM 800; 160 MG/1; MG/1
1 TABLET ORAL 2 TIMES DAILY
Qty: 14 TABLET | Refills: 0 | Status: SHIPPED | OUTPATIENT
Start: 2023-05-16

## 2023-05-16 RX ORDER — FINASTERIDE 5 MG/1
5 TABLET, FILM COATED ORAL DAILY
Qty: 30 TABLET | Refills: 0 | Status: SHIPPED | OUTPATIENT
Start: 2023-05-16

## 2023-05-16 RX ORDER — DOXYCYCLINE HYCLATE 100 MG/1
100 CAPSULE ORAL 2 TIMES DAILY
Qty: 28 CAPSULE | Refills: 0 | Status: SHIPPED | OUTPATIENT
Start: 2023-05-16 | End: 2023-05-30

## 2023-05-16 RX ORDER — TAMSULOSIN HYDROCHLORIDE 0.4 MG/1
1 CAPSULE ORAL DAILY
Qty: 30 CAPSULE | Refills: 0 | Status: SHIPPED | OUTPATIENT
Start: 2023-05-16

## 2023-05-16 NOTE — PROGRESS NOTES
"Chief Complaint:    Chief Complaint   Patient presents with   • Difficulty Urinating       Vital Signs:   BP (!) 144/103 (BP Location: Right arm, Patient Position: Sitting)   Pulse 98   Ht 177.8 cm (70\")   Wt 77.6 kg (171 lb)   BMI 24.54 kg/m²   Body mass index is 24.54 kg/m².      HPI:  Lacho Estrada is a 33 y.o. male who presents today for follow up    History of Present Illness  Mr. Estrada presents to the clinic for follow-up for  chronic prostatitis and difficulty urinating.  He was previously seen by Dr. Haider in January of this year and was started on Bactrim with some improvement of symptoms.  He was referred to Dr. Alvarado to undergo a inguinal hernia repair for which he underwent in February of this year.  Patient states that over the past month and a half he has noticed an increase in lower urinary tract symptoms.  He endorses dysuria, difficulty starting, frequency, urgency, and stopping and starting several times when urinating.  He also states that he feels that he needs to sit down to help with urination.  He was last sexually active roughly 1 month ago.  On digital rectal exam today his prostate is smooth and firm with slight tenderness to palpation.  No bogginess noted.  Testicles and penis are normal.      Past Medical History:  Past Medical History:   Diagnosis Date   • GERD (gastroesophageal reflux disease)    • Prostatitis 12/2022       Current Meds:  Current Outpatient Medications   Medication Sig Dispense Refill   • doxycycline (VIBRAMYCIN) 100 MG capsule Take 1 capsule by mouth 2 (Two) Times a Day for 14 days. 28 capsule 0   • finasteride (PROSCAR) 5 MG tablet Take 1 tablet by mouth Daily. 30 tablet 0   • sulfamethoxazole-trimethoprim (Bactrim DS) 800-160 MG per tablet Take 1 tablet by mouth 2 (Two) Times a Day. Start after finishing bactrim 14 tablet 0   • tamsulosin (FLOMAX) 0.4 MG capsule 24 hr capsule Take 1 capsule by mouth Daily. 30 capsule 0     No current facility-administered " medications for this visit.        Allergies:   No Known Allergies     Past Surgical History:  Past Surgical History:   Procedure Laterality Date   • APPENDECTOMY  3-6 months old    I answered yes because I had a hernia surgery when I was 3-6 months old and I do believe they took my appendix out as well. Not 100% sure   • ENDOSCOPY     • HERNIA REPAIR      as infant   • INGUINAL HERNIA REPAIR Bilateral 2/10/2023    Procedure: BILATERAL INGUINAL HERNIA REPAIR LAPAROSCOPIC WITH DAVINCI ROBOT;  Surgeon: Rell Alvarado MD;  Location: [facility];  Service: Robotics - DaVinci;  Laterality: Bilateral;       Social History:  Social History     Socioeconomic History   • Marital status: Significant Other   Tobacco Use   • Smoking status: Never   • Smokeless tobacco: Former   Vaping Use   • Vaping Use: Never used   Substance and Sexual Activity   • Alcohol use: Not Currently   • Drug use: Never   • Sexual activity: Not Currently       Family History:  Family History   Problem Relation Age of Onset   • Cancer Father         Started in the tube from the kidney to the bladder.       Review of Systems:  Review of Systems   Constitutional: Negative for fatigue, fever and unexpected weight change.   Respiratory: Negative for chest tightness and shortness of breath.    Cardiovascular: Negative for chest pain.   Gastrointestinal: Negative for abdominal pain, constipation, diarrhea, nausea and vomiting.   Genitourinary: Positive for difficulty urinating, dysuria, frequency, penile swelling and scrotal swelling. Negative for urgency.   Skin: Negative for rash.   Psychiatric/Behavioral: Negative for confusion and suicidal ideas.       Physical Exam:  Physical Exam  Constitutional:       General: He is not in acute distress.     Appearance: Normal appearance.   HENT:      Head: Normocephalic and atraumatic.      Nose: Nose normal.      Mouth/Throat:      Mouth: Mucous membranes are moist.   Eyes:      Conjunctiva/sclera:  Conjunctivae normal.   Cardiovascular:      Rate and Rhythm: Normal rate and regular rhythm.      Pulses: Normal pulses.      Heart sounds: Normal heart sounds.   Pulmonary:      Effort: Pulmonary effort is normal.      Breath sounds: Normal breath sounds.   Abdominal:      General: Bowel sounds are normal.      Palpations: Abdomen is soft.      Tenderness: There is no right CVA tenderness or left CVA tenderness.   Musculoskeletal:         General: Normal range of motion.      Cervical back: Normal range of motion.   Skin:     General: Skin is warm.   Neurological:      General: No focal deficit present.      Mental Status: He is alert and oriented to person, place, and time.   Psychiatric:         Mood and Affect: Mood normal.         Behavior: Behavior normal.         Thought Content: Thought content normal.         Judgment: Judgment normal.         Recent Image (CT and/or KUB):   CT Abdomen and Pelvis: No results found for this or any previous visit.     CT Stone Protocol: No results found for this or any previous visit.     KUB: No results found for this or any previous visit.       Labs:  Brief Urine Lab Results  (Last result in the past 365 days)      Color   Clarity   Blood   Leuk Est   Nitrite   Protein   CREAT   Urine HCG        05/16/23 1010 Yellow   Clear   Negative   Trace   Negative   1+               Office Visit on 05/16/2023   Component Date Value Ref Range Status   • Color 05/16/2023 Yellow  Yellow, Straw, Dark Yellow, Ramona Final   • Clarity, UA 05/16/2023 Clear  Clear Final   • Specific Gravity  05/16/2023 1.010  1.005 - 1.030 Final   • pH, Urine 05/16/2023 7.0  5.0 - 8.0 Final   • Leukocytes 05/16/2023 Trace (A)  Negative Final   • Nitrite, UA 05/16/2023 Negative  Negative Final   • Protein, POC 05/16/2023 1+ (A)  Negative mg/dL Final   • Glucose, UA 05/16/2023 Negative  Negative mg/dL Final   • Ketones, UA 05/16/2023 Trace (A)  Negative Final   • Urobilinogen, UA 05/16/2023 Normal  Normal, 0.2  E.U./dL Final   • Bilirubin 05/16/2023 Small (1+) (A)  Negative Final   • Blood, UA 05/16/2023 Negative  Negative Final   • Lot Number 05/16/2023 [id number]   Final   • Expiration Date 05/16/2023 2/8/24   Final        Procedure: None  Procedures     I have reviewed and agree with the above PMH, PSH, FMH, social history, medications, allergies, and labs.      Assessment/Plan:   Problem List Items Addressed This Visit        Genitourinary and Reproductive     Prostatitis, chronic - Primary    Relevant Medications    tamsulosin (FLOMAX) 0.4 MG capsule 24 hr capsule    finasteride (PROSCAR) 5 MG tablet    doxycycline (VIBRAMYCIN) 100 MG capsule    sulfamethoxazole-trimethoprim (Bactrim DS) 800-160 MG per tablet    Other Relevant Orders    POC Urinalysis Dipstick, Automated (Completed)    Urine Culture - Urine, Urine, Clean Catch       Health Maintenance:   Health Maintenance Due   Topic Date Due   • TDAP/TD VACCINES (2 - Tdap) 04/18/2013   • COVID-19 Vaccine (3 - Booster for Moderna series) 04/01/2022   • HEPATITIS C SCREENING  Never done   • ANNUAL PHYSICAL  Never done        Smoking Counseling: Never smoked or use smokeless tobacco    Urine Incontinence: Patient reports that he is not currently experiencing any symptoms of urinary incontinence.    Patient was given instructions and counseling regarding his condition or for health maintenance advice. Please see specific information pulled into the AVS if appropriate.    Patient Education:   Chronic prostatitis -patient presented to the clinic for follow-up for chronic prostatitis.  Discussed the pathophysiology of acute versus chronic prostatitis with the patient.  I discussed the signs and symptoms of prostatitis including lower urinary tract symptoms such as dysuria, gross hematuria, frequency, urgency, difficulty urinating, hematospermia, penile pain, perineum pain, and testicular pain.  Discussed types of treatment which can include conservative versus  antibiotic.  Patient has been on Bactrim in the past with minimal improvement.  States that symptoms have never fully went away.  He does endorse improvement with tamsulosin once daily.  This time recommend he start tamsulosin and finasteride daily.  Discussed the risks and side effects of these medications with the patient.  I discussed with the patient different antimicrobial therapy including sulfa antibiotics, fluoroquinolones, or doxycycline.  Discussed the risks and side effects of fluoroquinolones including tendinitis versus tendon rupture.  Given the patient had minimal improvement on Bactrim he does not wish to start any fluoroquinolones at this time we will start him on a 2-week course of doxycycline.  Discussed the risk and side effects of this medication.  I will also send in a short 7-day course of Bactrim for the patient to take after finishing doxycycline in case symptoms have not fully resolved.  Also recommended for the patient to use warm soaks and compresses twice daily for 10 to 15 minutes.  I would like for the patient to follow-up with us in roughly 1 month for reevaluation of symptoms.  Patient verbalized understanding and agreed to plan of care.    Visit Diagnoses:    ICD-10-CM ICD-9-CM   1. Prostatitis, chronic  N41.1 601.1       Meds Ordered During Visit:  New Medications Ordered This Visit   Medications   • tamsulosin (FLOMAX) 0.4 MG capsule 24 hr capsule     Sig: Take 1 capsule by mouth Daily.     Dispense:  30 capsule     Refill:  0   • finasteride (PROSCAR) 5 MG tablet     Sig: Take 1 tablet by mouth Daily.     Dispense:  30 tablet     Refill:  0   • doxycycline (VIBRAMYCIN) 100 MG capsule     Sig: Take 1 capsule by mouth 2 (Two) Times a Day for 14 days.     Dispense:  28 capsule     Refill:  0   • sulfamethoxazole-trimethoprim (Bactrim DS) 800-160 MG per tablet     Sig: Take 1 tablet by mouth 2 (Two) Times a Day. Start after finishing bactrim     Dispense:  14 tablet     Refill:  0        Follow Up Appointment: 1 month  No follow-ups on file.      This document has been electronically signed by Grant Tierney PA-C   May 16, 2023 10:59 EDT    Part of this note may be an electronic transcription/translation of spoken language to printed text using the Dragon Dictation System.

## 2023-05-17 LAB — BACTERIA SPEC AEROBE CULT: NO GROWTH

## 2023-06-16 ENCOUNTER — OFFICE VISIT (OUTPATIENT)
Dept: UROLOGY | Facility: CLINIC | Age: 33
End: 2023-06-16
Payer: COMMERCIAL

## 2023-06-16 VITALS
BODY MASS INDEX: 25.31 KG/M2 | DIASTOLIC BLOOD PRESSURE: 81 MMHG | HEART RATE: 95 BPM | WEIGHT: 176.8 LBS | SYSTOLIC BLOOD PRESSURE: 124 MMHG | HEIGHT: 70 IN

## 2023-06-16 DIAGNOSIS — N41.1 PROSTATITIS, CHRONIC: ICD-10-CM

## 2023-06-16 RX ORDER — TAMSULOSIN HYDROCHLORIDE 0.4 MG/1
1 CAPSULE ORAL DAILY
Qty: 90 CAPSULE | Refills: 3 | Status: SHIPPED | OUTPATIENT
Start: 2023-06-16

## 2023-06-16 NOTE — PROGRESS NOTES
"Chief Complaint:    Chief Complaint   Patient presents with   • Difficulty Urinating       Vital Signs:   /81 (BP Location: Right arm)   Pulse 95   Ht 177.8 cm (70\")   Wt 80.2 kg (176 lb 12.8 oz)   BMI 25.37 kg/m²   Body mass index is 25.37 kg/m².      HPI:  Lacho Estrada is a 33 y.o. male who presents today for follow up    History of Present Illness  Mr. Estrada presents to the clinic for follow-up of difficulty urinating and prostatitis.  I did start the patient on a 2-week course of Bactrim twice daily as well as Flomax once daily.  Patient reports a significant improvement in symptoms.  He states that he has tried discontinuing Flomax however still has a difficulty urinating.  He endorses a weak stream, hesitancy, and intermittency.  He denies any perineum pain, gross hematuria, hematospermia, painful ejaculation, pain with defecation, or fever or chills.       Past Medical History:  Past Medical History:   Diagnosis Date   • GERD (gastroesophageal reflux disease)    • Prostatitis 12/2022       Current Meds:  Current Outpatient Medications   Medication Sig Dispense Refill   • finasteride (PROSCAR) 5 MG tablet Take 1 tablet by mouth Daily. 30 tablet 0   • sulfamethoxazole-trimethoprim (Bactrim DS) 800-160 MG per tablet Take 1 tablet by mouth 2 (Two) Times a Day. Start after finishing bactrim 14 tablet 0   • tamsulosin (FLOMAX) 0.4 MG capsule 24 hr capsule Take 1 capsule by mouth Daily. 90 capsule 3     No current facility-administered medications for this visit.        Allergies:   No Known Allergies     Past Surgical History:  Past Surgical History:   Procedure Laterality Date   • APPENDECTOMY  3-6 months old    I answered yes because I had a hernia surgery when I was 3-6 months old and I do believe they took my appendix out as well. Not 100% sure   • ENDOSCOPY     • HERNIA REPAIR      as infant   • INGUINAL HERNIA REPAIR Bilateral 2/10/2023    Procedure: BILATERAL INGUINAL HERNIA REPAIR LAPAROSCOPIC " WITH DAVINCI ROBOT;  Surgeon: Rell Alvarado MD;  Location: [facility];  Service: Robotics - DaVinci;  Laterality: Bilateral;       Social History:  Social History     Socioeconomic History   • Marital status: Significant Other   Tobacco Use   • Smoking status: Never   • Smokeless tobacco: Former   Vaping Use   • Vaping Use: Never used   Substance and Sexual Activity   • Alcohol use: Not Currently   • Drug use: Never   • Sexual activity: Not Currently       Family History:  Family History   Problem Relation Age of Onset   • Cancer Father         Started in the tube from the kidney to the bladder.       Review of Systems:  Review of Systems   Constitutional: Negative for chills, fatigue, fever and unexpected weight change.   Respiratory: Negative for cough, chest tightness, shortness of breath and wheezing.    Cardiovascular: Negative for chest pain and leg swelling.   Gastrointestinal: Negative for abdominal pain, constipation, diarrhea, nausea and vomiting.   Genitourinary: Positive for difficulty urinating. Negative for dysuria, frequency, hematuria and urgency.   Musculoskeletal: Negative for back pain and joint swelling.   Skin: Negative for rash.   Neurological: Negative for dizziness and headaches.   Psychiatric/Behavioral: Negative for confusion and suicidal ideas.       Physical Exam:  Physical Exam  Constitutional:       General: He is not in acute distress.     Appearance: Normal appearance.   HENT:      Head: Normocephalic and atraumatic.      Nose: Nose normal.      Mouth/Throat:      Mouth: Mucous membranes are moist.   Eyes:      Conjunctiva/sclera: Conjunctivae normal.   Cardiovascular:      Rate and Rhythm: Normal rate and regular rhythm.      Pulses: Normal pulses.      Heart sounds: Normal heart sounds.   Pulmonary:      Effort: Pulmonary effort is normal.      Breath sounds: Normal breath sounds.   Abdominal:      General: Bowel sounds are normal.      Palpations: Abdomen is soft.       Tenderness: There is no right CVA tenderness or left CVA tenderness.   Musculoskeletal:         General: Normal range of motion.      Cervical back: Normal range of motion.   Skin:     General: Skin is warm.   Neurological:      General: No focal deficit present.      Mental Status: He is alert and oriented to person, place, and time.   Psychiatric:         Mood and Affect: Mood normal.         Behavior: Behavior normal.         Thought Content: Thought content normal.         Judgment: Judgment normal.         Recent Image (CT and/or KUB):   CT Abdomen and Pelvis: No results found for this or any previous visit.     CT Stone Protocol: No results found for this or any previous visit.     KUB: No results found for this or any previous visit.       Labs:  Brief Urine Lab Results  (Last result in the past 365 days)      Color   Clarity   Blood   Leuk Est   Nitrite   Protein   CREAT   Urine HCG        05/16/23 1010 Yellow   Clear   Negative   Trace   Negative   1+               Office Visit on 05/16/2023   Component Date Value Ref Range Status   • Color 05/16/2023 Yellow  Yellow, Straw, Dark Yellow, Ramona Final   • Clarity, UA 05/16/2023 Clear  Clear Final   • Specific Gravity  05/16/2023 1.010  1.005 - 1.030 Final   • pH, Urine 05/16/2023 7.0  5.0 - 8.0 Final   • Leukocytes 05/16/2023 Trace (A)  Negative Final   • Nitrite, UA 05/16/2023 Negative  Negative Final   • Protein, POC 05/16/2023 1+ (A)  Negative mg/dL Final   • Glucose, UA 05/16/2023 Negative  Negative mg/dL Final   • Ketones, UA 05/16/2023 Trace (A)  Negative Final   • Urobilinogen, UA 05/16/2023 Normal  Normal, 0.2 E.U./dL Final   • Bilirubin 05/16/2023 Small (1+) (A)  Negative Final   • Blood, UA 05/16/2023 Negative  Negative Final   • Lot Number 05/16/2023 [id number]   Final   • Expiration Date 05/16/2023 2/8/24   Final   • Urine Culture 05/16/2023 No growth   Final        Procedure: None  Procedures     I have reviewed and agree with the above [facility],  PSH, FMH, social history, medications, allergies, and labs.     Assessment/Plan:   Problem List Items Addressed This Visit        Genitourinary and Reproductive     Prostatitis, chronic    Relevant Medications    tamsulosin (FLOMAX) 0.4 MG capsule 24 hr capsule       Health Maintenance:   Health Maintenance Due   Topic Date Due   • TDAP/TD VACCINES (2 - Tdap) 04/18/2013   • COVID-19 Vaccine (3 - Moderna series) 04/01/2022   • HEPATITIS C SCREENING  Never done   • ANNUAL PHYSICAL  Never done        Smoking Counseling: Patient is a former user of smokeless tobacco.  Never used smokeless tobacco.    Urine Incontinence: Patient reports that he is not currently experiencing any symptoms of urinary incontinence.    Patient was given instructions and counseling regarding his condition or for health maintenance advice. Please see specific information pulled into the AVS if appropriate.    Patient Education:   Prostatitis -patient presents for a 1 month follow-up for chronic prostatitis.  He was given a 2-week course of Bactrim twice daily with improvement of symptoms.  He was also started on Flomax and finasteride for 1 month.  Patient reports that he has tried discontinuing Flomax but still endorses a weak urinary stream, intermittency, and hesitancy.  Patient denies any other symptoms of prostatitis at this time.  He has tried going off in the past however has difficulty urinating.  This time recommend a urodynamic study.  I discussed the risks and benefits of this procedure.  I discussed this procedure in detail.  I will schedule patient for urodynamic study on July 19, 2023.  Advised patient that if urodynamic study is negative or show concerns for prostate enlargement we can proceed forward with cystoscopy.  Patient would like to discontinue finasteride at this time.  We will continue Flomax once nightly.  Advised patient to call if he has any questions or concerns.  Patient verbalized understanding agreed to plan of  care.    Visit Diagnoses:    ICD-10-CM ICD-9-CM   1. Prostatitis, chronic  N41.1 601.1       Meds Ordered During Visit:  New Medications Ordered This Visit   Medications   • tamsulosin (FLOMAX) 0.4 MG capsule 24 hr capsule     Sig: Take 1 capsule by mouth Daily.     Dispense:  90 capsule     Refill:  3       Follow Up Appointment: Roughly 1 month for urodynamic study  No follow-ups on file.      This document has been electronically signed by Grant Tierney PA-C   June 18, 2023 21:32 EDT    Part of this note may be an electronic transcription/translation of spoken language to printed text using the Dragon Dictation System.

## 2023-07-26 ENCOUNTER — OFFICE VISIT (OUTPATIENT)
Dept: UROLOGY | Facility: CLINIC | Age: 33
End: 2023-07-26
Payer: COMMERCIAL

## 2023-07-26 VITALS
WEIGHT: 177.2 LBS | DIASTOLIC BLOOD PRESSURE: 78 MMHG | HEIGHT: 70 IN | BODY MASS INDEX: 25.37 KG/M2 | HEART RATE: 112 BPM | SYSTOLIC BLOOD PRESSURE: 123 MMHG

## 2023-07-26 DIAGNOSIS — N41.1 PROSTATITIS, CHRONIC: Primary | ICD-10-CM

## 2023-07-26 RX ORDER — TADALAFIL 5 MG/1
5 TABLET ORAL DAILY
Qty: 30 TABLET | Refills: 2 | Status: SHIPPED | OUTPATIENT
Start: 2023-07-26

## 2023-07-26 RX ORDER — CIPROFLOXACIN 500 MG/1
500 TABLET, FILM COATED ORAL 2 TIMES DAILY
Qty: 20 TABLET | Refills: 0 | Status: SHIPPED | OUTPATIENT
Start: 2023-07-26

## 2023-07-26 NOTE — PROGRESS NOTES
"Chief Complaint:    Chief Complaint   Patient presents with    prostatitis        Vital Signs:   /78 (BP Location: Left arm, Patient Position: Sitting, Cuff Size: Adult)   Pulse 112   Ht 177.8 cm (70\")   Wt 80.4 kg (177 lb 3.2 oz)   BMI 25.43 kg/m²   Body mass index is 25.43 kg/m².      HPI:  Lacho Estrada is a 33 y.o. male who presents today for follow up    History of Present Illness  Mr. Estrada presents to the clinic for a follow-up.  Patient was scheduled undergo urodynamic study however ever due to in 16 circumstances all future urodynamic studies have been canceled due to a lapse in office contract.  Patient had significant improvement on Bactrim and Flomax at last office visit however reports an increase in pain and pressure in the perineum area over the past 2 to 3 weeks.  He also does endorse retrograde ejaculation and still has difficulty urinating.  States that his semen has seemed \"clumpy.\" He states he has significant pressure and has to push/strain to urinate.  He denies any hematuria, hematospermia, fever, chills, hematochezia, or penile discharge.    Past Medical History:  Past Medical History:   Diagnosis Date    GERD (gastroesophageal reflux disease)     Prostatitis 12/2022       Current Meds:  Current Outpatient Medications   Medication Sig Dispense Refill    tamsulosin (FLOMAX) 0.4 MG capsule 24 hr capsule Take 1 capsule by mouth Daily. 90 capsule 3    ciprofloxacin (Cipro) 500 MG tablet Take 1 tablet by mouth 2 (Two) Times a Day. 20 tablet 0    tadalafil (CIALIS) 5 MG tablet Take 1 tablet by mouth Daily. 30 tablet 2     No current facility-administered medications for this visit.        Allergies:   No Known Allergies     Past Surgical History:  Past Surgical History:   Procedure Laterality Date    APPENDECTOMY  3-6 months old    I answered yes because I had a hernia surgery when I was 3-6 months old and I do believe they took my appendix out as well. Not 100% sure    ENDOSCOPY      " HERNIA REPAIR      as infant    INGUINAL HERNIA REPAIR Bilateral 2/10/2023    Procedure: BILATERAL INGUINAL HERNIA REPAIR LAPAROSCOPIC WITH DAVINCI ROBOT;  Surgeon: Rell Alvarado MD;  Location: [facility];  Service: Robotics - DaVinci;  Laterality: Bilateral;       Social History:  Social History     Socioeconomic History    Marital status: Significant Other   Tobacco Use    Smoking status: Never    Smokeless tobacco: Former   Vaping Use    Vaping Use: Never used   Substance and Sexual Activity    Alcohol use: Not Currently    Drug use: Never    Sexual activity: Not Currently       Family History:  Family History   Problem Relation Age of Onset    Cancer Father         Started in the tube from the kidney to the bladder.       Review of Systems:  Review of Systems   Constitutional:  Negative for chills, fatigue, fever and unexpected weight change.   Respiratory:  Negative for cough, chest tightness, shortness of breath and wheezing.    Cardiovascular:  Negative for chest pain and leg swelling.   Gastrointestinal:  Negative for abdominal pain, constipation, diarrhea, nausea and vomiting.   Genitourinary:  Positive for difficulty urinating and penile pain. Negative for dysuria, frequency, hematuria, penile discharge, penile swelling, scrotal swelling and urgency.        Perineum pain   Musculoskeletal:  Negative for back pain and joint swelling.   Skin:  Negative for rash.   Neurological:  Negative for dizziness and headaches.   Psychiatric/Behavioral:  Negative for confusion and suicidal ideas.      Physical Exam:  Physical Exam  Constitutional:       General: He is not in acute distress.     Appearance: Normal appearance.   HENT:      Head: Normocephalic and atraumatic.      Nose: Nose normal.      Mouth/Throat:      Mouth: Mucous membranes are moist.   Eyes:      Conjunctiva/sclera: Conjunctivae normal.   Cardiovascular:      Rate and Rhythm: Normal rate and regular rhythm.      Pulses: Normal pulses.       Heart sounds: Normal heart sounds.   Pulmonary:      Effort: Pulmonary effort is normal.      Breath sounds: Normal breath sounds.   Abdominal:      General: Bowel sounds are normal.      Palpations: Abdomen is soft.   Musculoskeletal:         General: Normal range of motion.      Cervical back: Normal range of motion.   Skin:     General: Skin is warm.   Neurological:      General: No focal deficit present.      Mental Status: He is alert and oriented to person, place, and time.   Psychiatric:         Mood and Affect: Mood normal.         Behavior: Behavior normal.         Thought Content: Thought content normal.         Judgment: Judgment normal.           Recent Image (CT and/or KUB):   CT Abdomen and Pelvis: No results found for this or any previous visit.     CT Stone Protocol: No results found for this or any previous visit.     KUB: No results found for this or any previous visit.       Labs:  Brief Urine Lab Results  (Last result in the past 365 days)        Color   Clarity   Blood   Leuk Est   Nitrite   Protein   CREAT   Urine HCG        05/16/23 1010 Yellow   Clear   Negative   Trace   Negative   1+                 Office Visit on 05/16/2023   Component Date Value Ref Range Status    Color 05/16/2023 Yellow  Yellow, Straw, Dark Yellow, Ramona Final    Clarity, UA 05/16/2023 Clear  Clear Final    Specific Gravity  05/16/2023 1.010  1.005 - 1.030 Final    pH, Urine 05/16/2023 7.0  5.0 - 8.0 Final    Leukocytes 05/16/2023 Trace (A)  Negative Final    Nitrite, UA 05/16/2023 Negative  Negative Final    Protein, POC 05/16/2023 1+ (A)  Negative mg/dL Final    Glucose, UA 05/16/2023 Negative  Negative mg/dL Final    Ketones, UA 05/16/2023 Trace (A)  Negative Final    Urobilinogen, UA 05/16/2023 Normal  Normal, 0.2 E.U./dL Final    Bilirubin 05/16/2023 Small (1+) (A)  Negative Final    Blood, UA 05/16/2023 Negative  Negative Final    Lot Number 05/16/2023 [id number]   Final    Expiration Date 05/16/2023 2/8/24    Final    Urine Culture 05/16/2023 No growth   Final        Procedure: None  Procedures     I have reviewed and agree with the above PMH, PSH, FMH, social history, medications, allergies, and labs.     Assessment/Plan:   Problem List Items Addressed This Visit          Genitourinary and Reproductive     Prostatitis, chronic - Primary    Relevant Medications    tadalafil (CIALIS) 5 MG tablet    ciprofloxacin (Cipro) 500 MG tablet       Health Maintenance:   Health Maintenance Due   Topic Date Due    TDAP/TD VACCINES (2 - Tdap) 04/18/2013    COVID-19 Vaccine (3 - Moderna series) 04/01/2022    HEPATITIS C SCREENING  Never done    ANNUAL PHYSICAL  Never done        Smoking Counseling: Never smoked.  Former user smokeless tobacco.    Urine Incontinence: Patient reports that he is not currently experiencing any symptoms of urinary incontinence.    Patient was given instructions and counseling regarding his condition or for health maintenance advice. Please see specific information pulled into the AVS if appropriate.    Patient Education:   Chronic prostatitis -patient has history of chronic prostatitis and been on several rounds of Bactrim with some improvement however has had significant retrograde ejaculation secondary to alpha blockade with Flomax recently with worsening symptoms.  This time recommended to discontinue Flomax.  I will start the patient on a trial of tadalafil 5 mg once daily to help with lower urinary tract symptoms.  Discussed the risk and benefits of this medication.  Advised patient discontinue if he begins to experience lightheadedness, dizziness, syncope, chest pain, shortness of breath, painful erections.  Advised on return to our clinic or the nearest ER if he has an erection lasting longer than 2 to 4 hours.  Also start the patient on a trial of ciprofloxacin 500 mg twice daily for 10 days.  Discussed the risks of ciprofloxacin's including the risk for tendon rupture/tendinitis.  Also advised  patient that given history of chronic prostatitis and may take several weeks of antibiotics to completely heal.  Did discuss the use of cystoscopy in office for evaluation of prostatic outlet obstruction versus prostatic stones leading to recurrent infection.  Patient verbalized understanding would like to hold off at this time.  Advised patient to call if symptoms worsen.  Otherwise we will see him back on an as-needed basis.    Visit Diagnoses:    ICD-10-CM ICD-9-CM   1. Prostatitis, chronic  N41.1 601.1       Meds Ordered During Visit:  New Medications Ordered This Visit   Medications    tadalafil (CIALIS) 5 MG tablet     Sig: Take 1 tablet by mouth Daily.     Dispense:  30 tablet     Refill:  2    ciprofloxacin (Cipro) 500 MG tablet     Sig: Take 1 tablet by mouth 2 (Two) Times a Day.     Dispense:  20 tablet     Refill:  0       Follow Up Appointment: As needed  No follow-ups on file.      This document has been electronically signed by Grant Tierney PA-C   July 26, 2023 16:16 EDT    Part of this note may be an electronic transcription/translation of spoken language to printed text using the Dragon Dictation System.

## 2023-08-22 DIAGNOSIS — N41.1 PROSTATITIS, CHRONIC: Primary | ICD-10-CM

## 2023-09-06 ENCOUNTER — PROCEDURE VISIT (OUTPATIENT)
Dept: UROLOGY | Facility: CLINIC | Age: 33
End: 2023-09-06
Payer: COMMERCIAL

## 2023-09-06 VITALS
HEIGHT: 70 IN | BODY MASS INDEX: 25.38 KG/M2 | WEIGHT: 177.25 LBS | DIASTOLIC BLOOD PRESSURE: 77 MMHG | SYSTOLIC BLOOD PRESSURE: 120 MMHG

## 2023-09-06 DIAGNOSIS — N41.1 PROSTATITIS, CHRONIC: Primary | ICD-10-CM

## 2023-09-06 PROCEDURE — 51728 CYSTOMETROGRAM W/VP: CPT | Performed by: UROLOGY

## 2023-09-06 PROCEDURE — 51797 INTRAABDOMINAL PRESSURE TEST: CPT | Performed by: UROLOGY

## 2023-09-06 PROCEDURE — 51784 ANAL/URINARY MUSCLE STUDY: CPT | Performed by: UROLOGY

## 2023-09-06 PROCEDURE — 51741 ELECTRO-UROFLOWMETRY FIRST: CPT | Performed by: UROLOGY

## 2023-09-28 ENCOUNTER — OFFICE VISIT (OUTPATIENT)
Dept: UROLOGY | Facility: CLINIC | Age: 33
End: 2023-09-28
Payer: COMMERCIAL

## 2023-09-28 VITALS
DIASTOLIC BLOOD PRESSURE: 77 MMHG | SYSTOLIC BLOOD PRESSURE: 124 MMHG | HEIGHT: 70 IN | HEART RATE: 101 BPM | BODY MASS INDEX: 25.54 KG/M2 | WEIGHT: 178.4 LBS

## 2023-09-28 DIAGNOSIS — R39.198 DIFFICULTY URINATING: ICD-10-CM

## 2023-09-28 DIAGNOSIS — R39.12 BENIGN PROSTATIC HYPERPLASIA WITH WEAK URINARY STREAM: Primary | ICD-10-CM

## 2023-09-28 DIAGNOSIS — N32.81 OVERACTIVE BLADDER: ICD-10-CM

## 2023-09-28 DIAGNOSIS — N40.1 BENIGN PROSTATIC HYPERPLASIA WITH WEAK URINARY STREAM: Primary | ICD-10-CM

## 2023-09-28 PROBLEM — N41.1 PROSTATITIS, CHRONIC: Status: RESOLVED | Noted: 2022-12-09 | Resolved: 2023-09-28

## 2023-09-28 LAB
BILIRUB BLD-MCNC: NEGATIVE MG/DL
CLARITY, POC: CLEAR
COLOR UR: YELLOW
EXPIRATION DATE: NORMAL
GLUCOSE UR STRIP-MCNC: NEGATIVE MG/DL
KETONES UR QL: NEGATIVE
LEUKOCYTE EST, POC: NEGATIVE
Lab: NORMAL
NITRITE UR-MCNC: NEGATIVE MG/ML
PH UR: 7 [PH] (ref 5–8)
PROT UR STRIP-MCNC: NEGATIVE MG/DL
RBC # UR STRIP: NEGATIVE /UL
SP GR UR: 1.01 (ref 1–1.03)
UROBILINOGEN UR QL: NORMAL

## 2023-09-28 RX ORDER — TADALAFIL 5 MG/1
5 TABLET ORAL DAILY
Qty: 30 TABLET | Refills: 2 | Status: SHIPPED | OUTPATIENT
Start: 2023-09-28

## 2023-09-28 NOTE — PROGRESS NOTES
"Chief Complaint:    Chief Complaint   Patient presents with    Urinary Urgency    prostatitis      Follow up / Review Urodynamics        Vital Signs:   /77   Pulse 101   Ht 177.8 cm (70\")   Wt 80.9 kg (178 lb 6.4 oz)   BMI 25.60 kg/m²   Body mass index is 25.6 kg/m².      HPI:  Lacho Estrada is a 33 y.o. male who presents today for follow up    History of Present Illness  Mr. Estrada presents to the clinic for urodynamic study follow-up.  Patient has a history of prostatitis and weak urinary stream with difficulty urinating.  He has been on Flomax and tadalafil 5 mg with some improvement.  His urodynamic study did show overactive bladder with increased sensation and desire to void at roughly 179 mL.  He had a significant weak urinary stream however there was minimal abdominal muscle recruitment to assist in voiding.  His detrusor was normal during the filling and voiding phase.  Patient reports that he has an increase in back pain and difficulty peeing if he misses Flomax.  Still gets up roughly 4 times throughout the night.  He has an IPSS score 13 at this time. We will given concerns of possible ureteral stricture or other abnormalities of the prostate we will schedule him for a cystoscopy in office.  We will have him continue tadalafil once daily and Flomax as needed.     Past Medical History:  Past Medical History:   Diagnosis Date    GERD (gastroesophageal reflux disease)     Prostatitis 12/2022       Current Meds:  Current Outpatient Medications   Medication Sig Dispense Refill    tadalafil (CIALIS) 5 MG tablet Take 1 tablet by mouth Daily. 30 tablet 2    tamsulosin (FLOMAX) 0.4 MG capsule 24 hr capsule Take 1 capsule by mouth Daily. 90 capsule 3     No current facility-administered medications for this visit.        Allergies:   No Known Allergies     Past Surgical History:  Past Surgical History:   Procedure Laterality Date    APPENDECTOMY  3-6 months old    I answered yes because I had a hernia " surgery when I was 3-6 months old and I do believe they took my appendix out as well. Not 100% sure    ENDOSCOPY      HERNIA REPAIR      as infant    INGUINAL HERNIA REPAIR Bilateral 2/10/2023    Procedure: BILATERAL INGUINAL HERNIA REPAIR LAPAROSCOPIC WITH DAVINCI ROBOT;  Surgeon: Rell Alvarado MD;  Location: [facility];  Service: Robotics - DaVinci;  Laterality: Bilateral;       Social History:  Social History     Socioeconomic History    Marital status:    Tobacco Use    Smoking status: Never    Smokeless tobacco: Former   Vaping Use    Vaping Use: Never used   Substance and Sexual Activity    Alcohol use: Not Currently    Drug use: Never    Sexual activity: Not Currently       Family History:  Family History   Problem Relation Age of Onset    Cancer Father         Started in the tube from the kidney to the bladder.       Review of Systems:  Review of Systems   Constitutional:  Negative for chills, fatigue, fever and unexpected weight change.   HENT:  Negative for congestion and sinus pressure.    Respiratory:  Negative for chest tightness and shortness of breath.    Cardiovascular:  Negative for chest pain.   Gastrointestinal:  Negative for abdominal pain, constipation, diarrhea, nausea and vomiting.   Genitourinary:  Positive for dysuria, flank pain, frequency and urgency. Negative for difficulty urinating and hematuria.   Musculoskeletal:  Positive for back pain. Negative for neck pain.   Skin:  Negative for rash.   Neurological:  Negative for dizziness and headaches.   Hematological:  Bruises/bleeds easily.   Psychiatric/Behavioral:  Negative for confusion and suicidal ideas. The patient is not nervous/anxious.      Physical Exam:  Physical Exam  Constitutional:       General: He is not in acute distress.     Appearance: Normal appearance.   HENT:      Head: Normocephalic and atraumatic.      Nose: Nose normal.      Mouth/Throat:      Mouth: Mucous membranes are moist.   Eyes:       Conjunctiva/sclera: Conjunctivae normal.   Cardiovascular:      Rate and Rhythm: Normal rate and regular rhythm.      Pulses: Normal pulses.      Heart sounds: Normal heart sounds.   Pulmonary:      Effort: Pulmonary effort is normal.      Breath sounds: Normal breath sounds.   Abdominal:      General: Bowel sounds are normal.      Palpations: Abdomen is soft.      Tenderness: There is no right CVA tenderness or left CVA tenderness.   Musculoskeletal:         General: Normal range of motion.      Cervical back: Normal range of motion.   Skin:     General: Skin is warm.   Neurological:      General: No focal deficit present.      Mental Status: He is alert and oriented to person, place, and time.   Psychiatric:         Mood and Affect: Mood normal.         Behavior: Behavior normal.         Thought Content: Thought content normal.         Judgment: Judgment normal.       IPSS Questionnaire (AUA-7):  IPSS Questionnaire (AUA-7):                  IPSS Questionnaire (AUA-7):  Over the past month…    1)  Incomplete Emptying  How often have you had a sensation of not emptying your bladder?  1 - Less than 1 time in 5   2)  Frequency  How often have you had to urinate less than every two hours? 0 - Not at all   3)  Intermittency  How often have you found you stopped and started again several times when you urinated?  2 - Less than half the time   4) Urgency  How often have you found it difficult to postpone urination?  1 - Less than 1 time in 5   5) Weak Stream  How often have you had a weak urinary stream?  3 - About half the time   6) Straining  How often have you had to push or strain to begin urination?  2 - Less than half the time   7) Nocturia  How many times did you typically get up at night to urinate?  4 - 4 times   Total Score:  13   The International Prostate Symptom Score (IPSS) is used to screen, diagnose, track symptoms of benign prostatic hyperplasia (BPH).    0-7 pts (Mild Symptoms)  / 8-19 pts (Moderate) /  20-35 (Severe)    Quality of life due to urinary symptoms:  If you were to spend the rest of your life with your urinary condition the way it is now, how would you feel about that? 3-Mixed   Urine Leakage (Incontinence) 0-No Leakage       Recent Image (CT and/or KUB):   CT Abdomen and Pelvis: No results found for this or any previous visit.     CT Stone Protocol: No results found for this or any previous visit.     KUB: No results found for this or any previous visit.       Labs:  Brief Urine Lab Results  (Last result in the past 365 days)        Color   Clarity   Blood   Leuk Est   Nitrite   Protein   CREAT   Urine HCG        09/28/23 1042 Yellow   Clear   Negative   Negative   Negative   Negative                 Office Visit on 09/28/2023   Component Date Value Ref Range Status    Color 09/28/2023 Yellow  Yellow, Straw, Dark Yellow, Ramona Final    Clarity, UA 09/28/2023 Clear  Clear Final    Specific Gravity  09/28/2023 1.010  1.005 - 1.030 Final    pH, Urine 09/28/2023 7.0  5.0 - 8.0 Final    Leukocytes 09/28/2023 Negative  Negative Final    Nitrite, UA 09/28/2023 Negative  Negative Final    Protein, POC 09/28/2023 Negative  Negative mg/dL Final    Glucose, UA 09/28/2023 Negative  Negative mg/dL Final    Ketones, UA 09/28/2023 Negative  Negative Final    Urobilinogen, UA 09/28/2023 Normal  Normal, 0.2 E.U./dL Final    Bilirubin 09/28/2023 Negative  Negative Final    Blood, UA 09/28/2023 Negative  Negative Final    Lot Number 09/28/2023 n   Final    Expiration Date 09/28/2023 n   Final        Procedure: None  Procedures     I have reviewed and agree with the above PMH, PSH, FMH, social history, medications, allergies, and labs.     Assessment/Plan:   Problem List Items Addressed This Visit          Genitourinary and Reproductive     RESOLVED: Prostatitis, chronic - Primary    Relevant Medications    tadalafil (CIALIS) 5 MG tablet     Other Visit Diagnoses       Overactive bladder        Relevant Medications     tadalafil (CIALIS) 5 MG tablet    Difficulty urinating                Health Maintenance:   Health Maintenance Due   Topic Date Due    TDAP/TD VACCINES (2 - Tdap) 04/18/2013    HEPATITIS C SCREENING  Never done    ANNUAL PHYSICAL  Never done    COVID-19 Vaccine (3 - 2023-24 season) 09/01/2023        Smoking Counseling: Never smoked.  Former user of smokeless tobacco.  Counseling given    Urine Incontinence: Patient reports that he is not currently experiencing any symptoms of urinary incontinence.    Patient was given instructions and counseling regarding his condition or for health maintenance advice. Please see specific information pulled into the AVS if appropriate.    Patient Education:   BPH - Benign Prostate Hypertrophy (BPH): Discussed the pathophysiology of BPH and obstruction.  We discussed the static and dynamic effects of BPH as well as using 5 alpha reductase inhibitors versus alpha blockade.  We discussed the indications for transurethral surgery as well and/ or other therapeutic options available including all of the newer techniques.  Given his continuation of difficulty urinating we will continue to use tadalafil 5 mg once daily.  Vies him to continue Flomax as needed.  Discussed the risk and benefits of these medications together.  Patient verbalized understanding.  Overactive bladder -discussed with the patient causes of overactive bladder which can include but are not limited to detrusor overactivity, urge incontinence, stress incontinence, or enlarged prostate.  W I did discuss the use of anticholinergics versus beta 3 agonist and the risk and benefits of these medications.  He would like to hold off on medications at this time.  I will schedule him for an office cystoscopy for evaluation of the lower urinary tract symptoms.  Patient verbalized understanding and agreed to plan of care.      Visit Diagnoses:    ICD-10-CM ICD-9-CM   1. Benign prostatic hyperplasia with weak urinary stream  N40.1  600.01    R39.12 788.62   2. Overactive bladder  N32.81 596.51   3. Difficulty urinating  R39.198 788.99       Meds Ordered During Visit:  New Medications Ordered This Visit   Medications    tadalafil (CIALIS) 5 MG tablet     Sig: Take 1 tablet by mouth Daily.     Dispense:  30 tablet     Refill:  2       Follow Up Appointment: Cystoscopy  No follow-ups on file.      This document has been electronically signed by Grant Tierney PA-C   September 28, 2023 13:01 EDT    Part of this note may be an electronic transcription/translation of spoken language to printed text using the Dragon Dictation System.

## 2023-10-16 ENCOUNTER — PROCEDURE VISIT (OUTPATIENT)
Dept: UROLOGY | Facility: CLINIC | Age: 33
End: 2023-10-16
Payer: COMMERCIAL

## 2023-10-16 VITALS
WEIGHT: 183 LBS | DIASTOLIC BLOOD PRESSURE: 83 MMHG | SYSTOLIC BLOOD PRESSURE: 138 MMHG | HEIGHT: 70 IN | BODY MASS INDEX: 26.2 KG/M2 | HEART RATE: 84 BPM

## 2023-10-16 DIAGNOSIS — N40.1 BENIGN PROSTATIC HYPERPLASIA WITH WEAK URINARY STREAM: Primary | ICD-10-CM

## 2023-10-16 DIAGNOSIS — R39.12 BENIGN PROSTATIC HYPERPLASIA WITH WEAK URINARY STREAM: Primary | ICD-10-CM

## 2023-10-16 RX ORDER — GENTAMICIN SULFATE 40 MG/ML
80 INJECTION, SOLUTION INTRAMUSCULAR; INTRAVENOUS ONCE
Status: COMPLETED | OUTPATIENT
Start: 2023-10-16 | End: 2023-10-16

## 2023-10-16 RX ADMIN — GENTAMICIN SULFATE 80 MG: 40 INJECTION, SOLUTION INTRAMUSCULAR; INTRAVENOUS at 14:28

## 2023-10-16 NOTE — PROGRESS NOTES
Chief Complaint:      Chief Complaint   Patient presents with    Benign prosatic hyperplasia with weak urinary stream    Cystoscopy       HPI:   33 y.o. male for cystoscopy secondary to difficulty voiding.    Past Medical History:     Past Medical History:   Diagnosis Date    GERD (gastroesophageal reflux disease)     Prostatitis 12/2022       Current Meds:     Current Outpatient Medications   Medication Sig Dispense Refill    tadalafil (CIALIS) 5 MG tablet Take 1 tablet by mouth Daily. 30 tablet 2    tamsulosin (FLOMAX) 0.4 MG capsule 24 hr capsule Take 1 capsule by mouth Daily. 90 capsule 3     No current facility-administered medications for this visit.        Allergies:      No Known Allergies     Past Surgical History:     Past Surgical History:   Procedure Laterality Date    APPENDECTOMY  3-6 months old    I answered yes because I had a hernia surgery when I was 3-6 months old and I do believe they took my appendix out as well. Not 100% sure    ENDOSCOPY      HERNIA REPAIR      as infant    INGUINAL HERNIA REPAIR Bilateral 2/10/2023    Procedure: BILATERAL INGUINAL HERNIA REPAIR LAPAROSCOPIC WITH DAVINCI ROBOT;  Surgeon: Rell Alvarado MD;  Location: [facility];  Service: Robotics - DaVinci;  Laterality: Bilateral;       Social History:     Social History     Socioeconomic History    Marital status:    Tobacco Use    Smoking status: Never    Smokeless tobacco: Former   Vaping Use    Vaping Use: Never used   Substance and Sexual Activity    Alcohol use: Not Currently    Drug use: Never    Sexual activity: Not Currently       Family History:     Family History   Problem Relation Age of Onset    Cancer Father         Started in the tube from the kidney to the bladder.       Review of Systems:     Review of Systems   Constitutional: Negative.    HENT: Negative.     Eyes: Negative.    Respiratory: Negative.     Cardiovascular: Negative.    Gastrointestinal: Negative.    Endocrine: Negative.     Musculoskeletal: Negative.    Allergic/Immunologic: Negative.    Neurological: Negative.    Hematological: Negative.    Psychiatric/Behavioral: Negative.         Physical Exam:     Physical Exam  Vitals and nursing note reviewed.   Constitutional:       Appearance: He is well-developed.   HENT:      Head: Normocephalic and atraumatic.   Eyes:      Conjunctiva/sclera: Conjunctivae normal.      Pupils: Pupils are equal, round, and reactive to light.   Cardiovascular:      Rate and Rhythm: Normal rate and regular rhythm.      Heart sounds: Normal heart sounds.   Pulmonary:      Effort: Pulmonary effort is normal.      Breath sounds: Normal breath sounds.   Abdominal:      General: Bowel sounds are normal.      Palpations: Abdomen is soft.   Genitourinary:     Penis: Normal.    Musculoskeletal:         General: Normal range of motion.      Cervical back: Normal range of motion.   Skin:     General: Skin is warm and dry.   Neurological:      Mental Status: He is alert and oriented to person, place, and time.      Deep Tendon Reflexes: Reflexes are normal and symmetric.   Psychiatric:         Behavior: Behavior normal.         Thought Content: Thought content normal.         Judgment: Judgment normal.         I have reviewed the following portions of the patient's history: Allergies, current medications, past family history, past medical history, past social history, past surgical history, problem list, and ROS and confirm it is accurate.    Recent Image (CT and/or KUB):      CT Abdomen and Pelvis: No results found for this or any previous visit.       CT Stone Protocol: No results found for this or any previous visit.       KUB: No results found for this or any previous visit.       Labs (past 3 months):      Office Visit on 09/28/2023   Component Date Value Ref Range Status    Color 09/28/2023 Yellow  Yellow, Straw, Dark Yellow, Ramona Final    Clarity, UA 09/28/2023 Clear  Clear Final    Specific Gravity  09/28/2023  1.010  1.005 - 1.030 Final    pH, Urine 09/28/2023 7.0  5.0 - 8.0 Final    Leukocytes 09/28/2023 Negative  Negative Final    Nitrite, UA 09/28/2023 Negative  Negative Final    Protein, POC 09/28/2023 Negative  Negative mg/dL Final    Glucose, UA 09/28/2023 Negative  Negative mg/dL Final    Ketones, UA 09/28/2023 Negative  Negative Final    Urobilinogen, UA 09/28/2023 Normal  Normal, 0.2 E.U./dL Final    Bilirubin 09/28/2023 Negative  Negative Final    Blood, UA 09/28/2023 Negative  Negative Final    Lot Number 09/28/2023 n   Final    Expiration Date 09/28/2023 n   Final        Procedure:   Cystoscopy:  Patient presents today for cystourethroscopy.  I went ahead and obtained an informed consent including the risks of anesthesia, bleeding, infection, etc.  After prepping and draping in a sterile fashion in the low dorsal lithotomy position, the urethra was gently anesthetized with 10 mL of 2% viscous Xylocaine jelly.  After an appropriate period of topical anesthesia, I used the Olympus digital 14 [place] flexible cystoscope to examine the anterior urethra which was completely normal.  The ureteral orifices were visualized and normal in position and configuration. There were no stones, tumors, or foreign bodies.  The blue light was enabled and was negative allowing us to see small mucosal lesions. The patient was given 80 mg of gentamicin in an intramuscular fashion as prophylaxis for the cystoscopy and released from the clinic.    Assessment/Plan:   BPH: Discussed the pathophysiology of BPH and obstruction.  We discussed the static and dynamic effects of BPH as well as using 5 alpha reductase inhibitors versus alpha blockade.  We discussed the indications for transurethral surgery as well and/ or other therapeutic options available including all of the newer techniques.  No evidence of stricture or blockage whatsoever recommend alpha blockade and Cialis.  He is can follow back in 6 months.          This document has  been electronically signed by HANNA SEALS MD October 16, 2023 14:01 EDT    Dictated Utilizing Dragon Dictation: Part of this note may be an electronic transcription/translation of spoken language to printed text using the Dragon Dictation System.

## 2023-10-18 PROBLEM — N40.1 BENIGN PROSTATIC HYPERPLASIA WITH WEAK URINARY STREAM: Status: ACTIVE | Noted: 2023-10-18

## 2023-10-18 PROBLEM — R39.12 BENIGN PROSTATIC HYPERPLASIA WITH WEAK URINARY STREAM: Status: ACTIVE | Noted: 2023-10-18

## 2023-10-19 ENCOUNTER — TELEPHONE (OUTPATIENT)
Dept: UROLOGY | Facility: CLINIC | Age: 33
End: 2023-10-19
Payer: COMMERCIAL

## 2023-10-19 NOTE — TELEPHONE ENCOUNTER
Caller: VENKAT    Relationship to patient: SELF    Best call back number: [phone]    Patient is needing: PT SPOKE TO YOU ON 10/16/23 AND HAS AGREED TO GET SCHEDULED FOR THE TRANS URETHAL SURGERY

## 2023-10-19 NOTE — TELEPHONE ENCOUNTER
Caller: VENKAT    Relationship to patient: SELF    Best call back number: [phone]    Patient is needing: PT SPOKE TO YOU ON 10/16/23 AND HAS AGREED TO GET SCHEDULED FOR THE TRANS URETHAL SURGERY

## 2023-10-19 NOTE — TELEPHONE ENCOUNTER
Called patient to let him know that Dr Haider did not want him to have surgery. Patient needs to take him medication that Dr Haider prescribed to him for 6 months. Patient verbalized understanding.

## 2024-03-08 ENCOUNTER — LAB (OUTPATIENT)
Dept: UROLOGY | Facility: CLINIC | Age: 34
End: 2024-03-08
Payer: COMMERCIAL

## 2024-03-08 DIAGNOSIS — N40.1 BENIGN PROSTATIC HYPERPLASIA WITH WEAK URINARY STREAM: Primary | ICD-10-CM

## 2024-03-08 DIAGNOSIS — R39.12 BENIGN PROSTATIC HYPERPLASIA WITH WEAK URINARY STREAM: Primary | ICD-10-CM

## 2024-03-08 LAB — TESTOST SERPL-MCNC: 553 NG/DL (ref 249–836)

## 2024-03-08 PROCEDURE — 84403 ASSAY OF TOTAL TESTOSTERONE: CPT

## 2024-03-11 ENCOUNTER — TELEPHONE (OUTPATIENT)
Dept: UROLOGY | Facility: CLINIC | Age: 34
End: 2024-03-11
Payer: COMMERCIAL

## 2024-03-11 DIAGNOSIS — R39.12 BENIGN PROSTATIC HYPERPLASIA WITH WEAK URINARY STREAM: ICD-10-CM

## 2024-03-11 DIAGNOSIS — N40.1 BENIGN PROSTATIC HYPERPLASIA WITH WEAK URINARY STREAM: ICD-10-CM

## 2024-03-11 RX ORDER — TADALAFIL 5 MG/1
5 TABLET ORAL DAILY
Qty: 30 TABLET | Refills: 2 | Status: SHIPPED | OUTPATIENT
Start: 2024-03-11

## 2024-03-11 NOTE — TELEPHONE ENCOUNTER
Called patient about testosterone results.  Advised him that came back great at roughly 553.  He does request a refill of his tadalafil and I will send this in.

## 2024-04-17 ENCOUNTER — OFFICE VISIT (OUTPATIENT)
Dept: UROLOGY | Facility: CLINIC | Age: 34
End: 2024-04-17
Payer: COMMERCIAL

## 2024-04-17 VITALS
DIASTOLIC BLOOD PRESSURE: 75 MMHG | HEART RATE: 97 BPM | WEIGHT: 187.2 LBS | BODY MASS INDEX: 26.8 KG/M2 | HEIGHT: 70 IN | SYSTOLIC BLOOD PRESSURE: 124 MMHG

## 2024-04-17 DIAGNOSIS — R39.12 BENIGN PROSTATIC HYPERPLASIA WITH WEAK URINARY STREAM: ICD-10-CM

## 2024-04-17 DIAGNOSIS — N40.1 BENIGN PROSTATIC HYPERPLASIA WITH WEAK URINARY STREAM: ICD-10-CM

## 2024-04-17 DIAGNOSIS — N41.1 PROSTATITIS, CHRONIC: ICD-10-CM

## 2024-04-17 RX ORDER — TADALAFIL 5 MG/1
5 TABLET ORAL DAILY
Qty: 90 TABLET | Refills: 3 | Status: SHIPPED | OUTPATIENT
Start: 2024-04-17

## 2024-04-17 RX ORDER — FINASTERIDE 5 MG/1
5 TABLET, FILM COATED ORAL DAILY
Qty: 30 TABLET | Refills: 5 | Status: SHIPPED | OUTPATIENT
Start: 2024-04-17

## 2024-04-17 RX ORDER — TAMSULOSIN HYDROCHLORIDE 0.4 MG/1
1 CAPSULE ORAL DAILY
Qty: 90 CAPSULE | Refills: 3 | Status: SHIPPED | OUTPATIENT
Start: 2024-04-17

## 2024-04-17 NOTE — PROGRESS NOTES
"Chief Complaint:    Chief Complaint   Patient presents with    Benign prostatic hyperplasia with weak urinary stream       Vital Signs:   /75   Pulse 97   Ht 177.8 cm (70\")   Wt 84.9 kg (187 lb 3.2 oz)   BMI 26.86 kg/m²   Body mass index is 26.86 kg/m².      HPI:  Lacho Estrada is a 34 y.o. male who presents today for follow up    History of Present Illness  Mr. Estrada presents the clinic for evaluation of BPH with weak stream.  He was last seen in office roughly 1 month ago and was concerned for low testosterone at that time.  Patient did have a testosterone completed that came back normal at 553.  He did have a previous cystoscopy completed by Dr. Haider in October 2023 and was found to have no significant blockage.  He did have some prostate inflammation.  Dr. Haider recommended to continue with cotherapy with tadalafil 5 mg daily as well as Flomax 0.4 mg as needed.  He returns today and denies any worsening of symptoms.  He states that he still has significant weak stream frequency, urgency, and sensations of incomplete emptying.  He reports taking tadalafil daily and Flomax once every few days.      Past Medical History:  Past Medical History:   Diagnosis Date    GERD (gastroesophageal reflux disease)     Prostatitis 12/2022       Current Meds:  Current Outpatient Medications   Medication Sig Dispense Refill    tadalafil (CIALIS) 5 MG tablet Take 1 tablet by mouth Daily. 90 tablet 3    tamsulosin (FLOMAX) 0.4 MG capsule 24 hr capsule Take 1 capsule by mouth Daily. 90 capsule 3    finasteride (PROSCAR) 5 MG tablet Take 1 tablet by mouth Daily. 30 tablet 5     No current facility-administered medications for this visit.        Allergies:   No Known Allergies     Past Surgical History:  Past Surgical History:   Procedure Laterality Date    APPENDECTOMY  3-6 months old    I answered yes because I had a hernia surgery when I was 3-6 months old and I do believe they took my appendix out as well. Not 100% " sure    ENDOSCOPY      HERNIA REPAIR      as infant    INGUINAL HERNIA REPAIR Bilateral 2/10/2023    Procedure: BILATERAL INGUINAL HERNIA REPAIR LAPAROSCOPIC WITH DAVINCI ROBOT;  Surgeon: Rell Alvarado MD;  Location: [facility];  Service: Robotics - DaVinci;  Laterality: Bilateral;       Social History:  Social History     Socioeconomic History    Marital status:    Tobacco Use    Smoking status: Never    Smokeless tobacco: Former   Vaping Use    Vaping status: Never Used   Substance and Sexual Activity    Alcohol use: Not Currently    Drug use: Never    Sexual activity: Not Currently       Family History:  Family History   Problem Relation Age of Onset    Cancer Father         Started in the tube from the kidney to the bladder.       Review of Systems:  Review of Systems   Constitutional:  Negative for fatigue, fever and unexpected weight change.   Respiratory:  Negative for chest tightness and shortness of breath.    Cardiovascular:  Negative for chest pain.   Gastrointestinal:  Negative for abdominal pain, constipation, diarrhea, nausea and vomiting.   Genitourinary:  Positive for difficulty urinating, frequency and urgency. Negative for dysuria and hematuria.   Skin:  Negative for rash.   Psychiatric/Behavioral:  Negative for confusion and suicidal ideas.        Physical Exam:  Physical Exam  Constitutional:       General: He is not in acute distress.     Appearance: Normal appearance.   HENT:      Head: Normocephalic and atraumatic.      Nose: Nose normal.      Mouth/Throat:      Mouth: Mucous membranes are moist.   Eyes:      Conjunctiva/sclera: Conjunctivae normal.   Cardiovascular:      Rate and Rhythm: Normal rate and regular rhythm.      Pulses: Normal pulses.      Heart sounds: Normal heart sounds.   Pulmonary:      Effort: Pulmonary effort is normal.      Breath sounds: Normal breath sounds.   Abdominal:      General: Bowel sounds are normal.      Palpations: Abdomen is soft.    Musculoskeletal:         General: Normal range of motion.      Cervical back: Normal range of motion.   Skin:     General: Skin is warm.   Neurological:      General: No focal deficit present.      Mental Status: He is alert and oriented to person, place, and time.   Psychiatric:         Mood and Affect: Mood normal.         Behavior: Behavior normal.         Thought Content: Thought content normal.         Judgment: Judgment normal.         Recent Image (CT and/or KUB):   CT Abdomen and Pelvis: No results found for this or any previous visit.     CT Stone Protocol: No results found for this or any previous visit.     KUB: No results found for this or any previous visit.       Labs:  Brief Urine Lab Results  (Last result in the past 365 days)        Color   Clarity   Blood   Leuk Est   Nitrite   Protein   CREAT   Urine HCG        09/28/23 1042 Yellow   Clear   Negative   Negative   Negative   Negative                 Lab on 03/08/2024   Component Date Value Ref Range Status    Testosterone, Total 03/08/2024 553.00  249.00 - 836.00 ng/dL Final        Procedure: None  Procedures     I have reviewed and agree with the above PMH, PSH, FMH, social history, medications, allergies, and labs.     Assessment/Plan:   Problem List Items Addressed This Visit          Genitourinary and Reproductive     Benign prostatic hyperplasia with weak urinary stream    Relevant Medications    tadalafil (CIALIS) 5 MG tablet    tamsulosin (FLOMAX) 0.4 MG capsule 24 hr capsule    finasteride (PROSCAR) 5 MG tablet     Other Visit Diagnoses       Prostatitis, chronic        Relevant Medications    tamsulosin (FLOMAX) 0.4 MG capsule 24 hr capsule    finasteride (PROSCAR) 5 MG tablet            Health Maintenance:   Health Maintenance Due   Topic Date Due    TDAP/TD VACCINES (2 - Tdap) 04/18/2013    HEPATITIS C SCREENING  Never done    ANNUAL PHYSICAL  Never done    COVID-19 Vaccine (3 - 2023-24 season) 09/01/2023    BMI FOLLOWUP  01/24/2024         Smoking Counseling: Never smoked.  Former user of smokeless tobacco.    Urine Incontinence: Patient reports that he is not currently experiencing any symptoms of urinary incontinence.    Patient was given instructions and counseling regarding his condition or for health maintenance advice. Please see specific information pulled into the AVS if appropriate.    Patient Education:   BPH with weak stream -discussed with the patient the pathophysiology of this condition in detail.  Discussed treatment options which can include medications versus surgical interventions.  Patient has been on dual therapy with Flomax and tadalafil with minimal improvement.  At this time recommending to add on finasteride 5 mg once daily.  Discussed the risk and benefits of this medication in detail with the patient.  Patient was interested in surgical procedure for trend resection on the prostate.  I did advise patient that given his current age side effects include but not limited to erectile dysfunction, retrograde/dry ejaculation, and infertility issues.  I did advise patient to hold off on surgical procedure for as long as possible.  He verbalized understanding and agreed to proceed forward with the start of finasteride.  Vies him to continue tadalafil daily and Flomax as needed.  Patient verbalized understanding.  Chronic prostatitis -patient has a past medical history of chronic inflammation and prostate infection.  He has been doing well on tadalafil and Flomax as needed.  Given the patient has having very minimal symptoms concerning for prostatitis we will continue with observation at this time.  Did advise patient to use warm soaks and compresses as needed.  Educated to return to the clinic sooner if needed otherwise I will place him back in 6 months.    Visit Diagnoses:    ICD-10-CM ICD-9-CM   1. Benign prostatic hyperplasia with weak urinary stream  N40.1 600.01    R39.12 788.62   2. Prostatitis, chronic  N41.1 601.1        Meds Ordered During Visit:  New Medications Ordered This Visit   Medications    tadalafil (CIALIS) 5 MG tablet     Sig: Take 1 tablet by mouth Daily.     Dispense:  90 tablet     Refill:  3    tamsulosin (FLOMAX) 0.4 MG capsule 24 hr capsule     Sig: Take 1 capsule by mouth Daily.     Dispense:  90 capsule     Refill:  3    finasteride (PROSCAR) 5 MG tablet     Sig: Take 1 tablet by mouth Daily.     Dispense:  30 tablet     Refill:  5       Follow Up Appointment: 6 months  No follow-ups on file.      This document has been electronically signed by Grant Tierney PA-C   April 17, 2024 13:40 EDT    Part of this note may be an electronic transcription/translation of spoken language to printed text using the Dragon Dictation System.

## 2025-07-24 ENCOUNTER — HOSPITAL ENCOUNTER (OUTPATIENT)
Facility: HOSPITAL | Age: 35
Discharge: HOME | End: 2025-07-24

## 2025-07-24 ENCOUNTER — TRANSCRIBE ORDERS (OUTPATIENT)
Dept: LAB | Facility: HOSPITAL | Age: 35
End: 2025-07-24
Payer: COMMERCIAL

## 2025-07-24 DIAGNOSIS — Z13.83 SCREENING FOR RESPIRATORY CONDITION: ICD-10-CM

## 2025-07-24 DIAGNOSIS — Z13.83 SCREENING FOR RESPIRATORY CONDITION: Primary | ICD-10-CM

## 2025-07-24 PROCEDURE — 71046 X-RAY EXAM CHEST 2 VIEWS: CPT

## (undated) DEVICE — LARGE NEEDLE DRIVER: Brand: ENDOWRIST

## (undated) DEVICE — INSUFFLATION NEEDLE TO ESTABLISH PNEUMOPERITONEUM.: Brand: INSUFFLATION NEEDLE

## (undated) DEVICE — UNDERGLV SURG BIOGEL INDICAT PF 8 GRN

## (undated) DEVICE — TIP COVER ACCESSORY

## (undated) DEVICE — APPL CHLORAPREP HI/LITE 26ML ORNG

## (undated) DEVICE — PK LAP GEN 70

## (undated) DEVICE — HOLDER: Brand: DEROYAL

## (undated) DEVICE — PERMANENT CAUTERY HOOK: Brand: ENDOWRIST

## (undated) DEVICE — PATIENT RETURN ELECTRODE, SINGLE-USE, CONTACT QUALITY MONITORING, ADULT, WITH 9FT CORD, FOR PATIENTS WEIGING OVER 33LBS. (15KG): Brand: MEGADYNE

## (undated) DEVICE — ADHS SKIN PREMIERPRO EXOFIN TOPICAL HI/VISC .5ML

## (undated) DEVICE — VIOLET BRAIDED (POLYGLACTIN 910), SYNTHETIC ABSORBABLE SUTURE: Brand: COATED VICRYL

## (undated) DEVICE — TOTAL TRAY, 16FR 10ML SIL FOLEY, URN: Brand: MEDLINE

## (undated) DEVICE — Device

## (undated) DEVICE — FENESTRATED BIPOLAR FORCEPS: Brand: ENDOWRIST

## (undated) DEVICE — 40595 XL TRENDELENBURG POSITIONING KIT: Brand: 40595 XL TRENDELENBURG POSITIONING KIT

## (undated) DEVICE — CANNULA SEAL

## (undated) DEVICE — ST TBG PNEUMOCLEAR EVAC SMOKE HIFLO

## (undated) DEVICE — DRAPE,UTILTY,TAPE,15X26, 4EA/PK: Brand: MEDLINE

## (undated) DEVICE — SUT VIC 0/0 UR6 27IN DYED J603H

## (undated) DEVICE — CVR DISP HUG U VAC STEEP TREND

## (undated) DEVICE — GLV SURG PREMIERPRO MIC LTX PF SZ7.5 BRN

## (undated) DEVICE — BLADELESS OBTURATOR: Brand: WECK VISTA

## (undated) DEVICE — COVER,MAYO STAND,STERILE: Brand: MEDLINE

## (undated) DEVICE — ARM DRAPE

## (undated) DEVICE — SUT MNCRYL PLS ANTIB UD 4/0 PS2 18IN

## (undated) DEVICE — PREMIUM WET SKIN PREP TRAY: Brand: MEDLINE INDUSTRIES, INC.

## (undated) DEVICE — MONOPOLAR CURVED SCISSORS: Brand: ENDOWRIST

## (undated) DEVICE — MARKR SKIN W/RULR AND LBL

## (undated) DEVICE — HDRST POSTIN FM CRDL TRACH SLOT 9X8X4IN